# Patient Record
Sex: FEMALE | Race: WHITE | NOT HISPANIC OR LATINO | Employment: FULL TIME | URBAN - METROPOLITAN AREA
[De-identification: names, ages, dates, MRNs, and addresses within clinical notes are randomized per-mention and may not be internally consistent; named-entity substitution may affect disease eponyms.]

---

## 2017-08-15 ENCOUNTER — OFFICE VISIT (OUTPATIENT)
Dept: FAMILY MEDICINE | Facility: CLINIC | Age: 27
End: 2017-08-15
Payer: COMMERCIAL

## 2017-08-15 VITALS
HEIGHT: 68 IN | DIASTOLIC BLOOD PRESSURE: 66 MMHG | BODY MASS INDEX: 44.41 KG/M2 | HEART RATE: 78 BPM | TEMPERATURE: 98.4 F | OXYGEN SATURATION: 97 % | SYSTOLIC BLOOD PRESSURE: 118 MMHG | WEIGHT: 293 LBS

## 2017-08-15 DIAGNOSIS — Z13.6 CARDIOVASCULAR SCREENING; LDL GOAL LESS THAN 160: ICD-10-CM

## 2017-08-15 DIAGNOSIS — N91.5 OLIGOMENORRHEA: ICD-10-CM

## 2017-08-15 DIAGNOSIS — F43.9 STRESS: ICD-10-CM

## 2017-08-15 DIAGNOSIS — E66.01 MORBID OBESITY, UNSPECIFIED OBESITY TYPE (H): ICD-10-CM

## 2017-08-15 DIAGNOSIS — Z00.01 ENCOUNTER FOR ROUTINE ADULT HEALTH EXAMINATION WITH ABNORMAL FINDINGS: Primary | ICD-10-CM

## 2017-08-15 PROCEDURE — 99385 PREV VISIT NEW AGE 18-39: CPT | Performed by: PHYSICIAN ASSISTANT

## 2017-08-15 PROCEDURE — 99213 OFFICE O/P EST LOW 20 MIN: CPT | Mod: 25 | Performed by: PHYSICIAN ASSISTANT

## 2017-08-15 ASSESSMENT — PATIENT HEALTH QUESTIONNAIRE - PHQ9
SUM OF ALL RESPONSES TO PHQ QUESTIONS 1-9: 11
SUM OF ALL RESPONSES TO PHQ QUESTIONS 1-9: 11
10. IF YOU CHECKED OFF ANY PROBLEMS, HOW DIFFICULT HAVE THESE PROBLEMS MADE IT FOR YOU TO DO YOUR WORK, TAKE CARE OF THINGS AT HOME, OR GET ALONG WITH OTHER PEOPLE: SOMEWHAT DIFFICULT

## 2017-08-15 NOTE — MR AVS SNAPSHOT
After Visit Summary   8/15/2017    Paz Uribe    MRN: [id number]           Patient Information     Date Of Birth          1990        Visit Information        Provider Department      8/15/2017 5:20 PM Adolfo Andrew PA-C [facility]        Today's Diagnoses     Encounter for routine adult health examination with abnormal findings    -  1    CARDIOVASCULAR SCREENING; LDL GOAL LESS THAN 160        Morbid obesity, unspecified obesity type (H)        Oligomenorrhea        Stress          Care Instructions      Preventive Health Recommendations  Female Ages 26 - 39  Yearly exam:   See your health care provider every year in order to    Review health changes.     Discuss preventive care.      Review your medicines if you your doctor has prescribed any.    Until age 30: Get a Pap test every three years (more often if you have had an abnormal result).    After age 30: Talk to your doctor about whether you should have a Pap test every 3 years or have a Pap test with HPV screening every 5 years.   You do not need a Pap test if your uterus was removed (hysterectomy) and you have not had cancer.  You should be tested each year for STDs (sexually transmitted diseases), if you're at risk.   Talk to your provider about how often to have your cholesterol checked.  If you are at risk for diabetes, you should have a diabetes test (fasting glucose).  Shots: Get a flu shot each year. Get a tetanus shot every 10 years.   Nutrition:     Eat at least 5 servings of fruits and vegetables each day.    Eat whole-grain bread, whole-wheat pasta and brown rice instead of white grains and rice.    Talk to your provider about Calcium and Vitamin D.     Lifestyle    Exercise at least 150 minutes a week (30 minutes a day, 5 days of the week). This will help you control your weight and prevent disease.    Limit alcohol to one drink per day.    No smoking.     Wear sunscreen to prevent skin  cancer.    See your dentist every six months for an exam and cleaning.    Preventive Health Recommendations  Female Ages 26 - 39  Yearly exam:   See your health care provider every year in order to    Review health changes.     Discuss preventive care.      Review your medicines if you your doctor has prescribed any.    Until age 30: Get a Pap test every three years (more often if you have had an abnormal result).    After age 30: Talk to your doctor about whether you should have a Pap test every 3 years or have a Pap test with HPV screening every 5 years.   You do not need a Pap test if your uterus was removed (hysterectomy) and you have not had cancer.  You should be tested each year for STDs (sexually transmitted diseases), if you're at risk.   Talk to your provider about how often to have your cholesterol checked.  If you are at risk for diabetes, you should have a diabetes test (fasting glucose).  Shots: Get a flu shot each year. Get a tetanus shot every 10 years.   Nutrition:     Eat at least 5 servings of fruits and vegetables each day.    Eat whole-grain bread, whole-wheat pasta and brown rice instead of white grains and rice.    Talk to your provider about Calcium and Vitamin D.     Lifestyle    Exercise at least 150 minutes a week (30 minutes a day, 5 days of the week). This will help you control your weight and prevent disease.    Limit alcohol to one drink per day.    No smoking.     Wear sunscreen to prevent skin cancer.    See your dentist every six months for an exam and cleaning.            Follow-ups after your visit        Who to contact     If you have questions or need follow up information about today's clinic visit or your schedule please contact [facility] directly at [phone].  Normal or non-critical lab and imaging results will be communicated to you by MyChart, letter or phone within 4 business days after the clinic has received the results. If you do not hear from us  "within 7 days, please contact the clinic through [employer]Ke or phone. If you have a critical or abnormal lab result, we will notify you by phone as soon as possible.  Submit refill requests through [employer] or call your pharmacy and they will forward the refill request to us. Please allow 3 business days for your refill to be completed.          Additional Information About Your Visit        Cellvine[employer] Information     QingKe lets you send messages to your doctor, view your test results, renew your prescriptions, schedule appointments and more. To sign up, go to [email or web address] . Click on \"Log in\" on the left side of the screen, which will take you to the Welcome page. Then click on \"Sign up Now\" on the right side of the page.     You will be asked to enter the access code listed below, as well as some personal information. Please follow the directions to create your username and password.     Your access code is: [email or web address]  Expires: 2017  6:09 PM     Your access code will  in 90 days. If you need help or a new code, please call your [place] clinic or [phone].        Care EveryWhere ID     This is your Care EveryWhere ID. This could be used by other organizations to access your [place] medical records  [id number]        Your Vitals Were     Pulse Temperature Height Last Period Pulse Oximetry BMI (Body Mass Index)    78 98.4  F (36.9  C) (Oral) 5' 7.5\" (1.715 m) 2017 (Approximate) 97% 50.68 kg/m2       Blood Pressure from Last 3 Encounters:   08/15/17 118/66   01/14/15 98/72    Weight from Last 3 Encounters:   08/15/17 (!) 328 lb 6.4 oz (149 kg)   01/14/15 257 lb (116.6 kg)              Today, you had the following     No orders found for display       Primary Care Provider Office Phone # Fax #    Adolfo Andrew PA-C [phone]       [id number]  [facility] [address]        Equal Access to Services     ANNITA HEARN AH: irma More " drea gaspar waxmichelle davidin hayaaten hodgesjeferson martinezten abeba. So [facility] [phone].    ATENCIÓN: Si siriala teofilo, tiene a cherry disposición servicios gratuitos de asistencia lingüística. Llame al [phone].    We comply with applicable federal civil rights laws and [place] laws. We do not discriminate on the basis of race, color, national origin, age, disability sex, sexual orientation or gender identity.            Thank you!     Thank you for choosing [facility]  for your care. Our goal is always to provide you with excellent care. Hearing back from our patients is one way we can continue to improve our services. Please take a few minutes to complete the written survey that you may receive in the mail after your visit with us. Thank you!             Your Updated Medication List - Protect others around you: Learn how to safely use, store and throw away your medicines at www.disposemymeds.org.      Notice  As of 8/15/2017  6:09 PM    You have not been prescribed any medications.

## 2017-08-15 NOTE — PROGRESS NOTES
SUBJECTIVE:   CC: Paz Uribe is an 27 year old woman who presents for preventive health visit.     Physical   Annual:     Getting at least 3 servings of Calcium per day::  Yes    Bi-annual eye exam::  NO    Dental care twice a year::  NO    Sleep apnea or symptoms of sleep apnea::  None    Diet::  Regular (no restrictions)    Frequency of exercise::  2-3 days/week    Duration of exercise::  30-45 minutes    Taking medications regularly::  Yes    Medication side effects::  Not applicable    Additional concerns today::  No      Patient presents for annual physical  She is utd on pap smear - last year [facility] 6/[facility]/16; results normal  Periods historically abnormal; Once every 2-3 months; not overly painful; mostly spotting, last 4-5 days  After starting ocps will get heavy bleeding, bad cramping  Would like to discuss PCOS?    She is also noting increasing stress   dealing with thyroid cancer; dealing with his bills  Family members with some CA as well  New position at work  Lots of stressors building up over time - less ability to manage        Today's PHQ-2 Score: PHQ-2 ( 1999 Pf[employer]) 8/15/2017   Q1: Little interest or pleasure in doing things 1   Q2: Feeling down, depressed or hopeless 2   PHQ-2 Score 3   Q1: Little interest or pleasure in doing things Several days   Q2: Feeling down, depressed or hopeless More than half the days   PHQ-2 Score 3       Abuse: Current or Past(Physical, Sexual or Emotional)- No  Do you feel safe in your environment - Yes    Social History   Substance Use Topics     Smoking status: Never Smoker     Smokeless tobacco: Never Used     Alcohol use No     The patient does not drink >3 drinks per day nor >7 drinks per week.    Reviewed orders with patient.  Reviewed health maintenance and updated orders accordingly - Yes  Labs reviewed in EPIC    Mammogram not appropriate for this patient based on age.    Pertinent mammograms are reviewed under the imaging tab.  History of  "abnormal Pap smear: NO - age 21-29 PAP every 3 years recommended    Reviewed and updated as needed this visit by clinical staff  Tobacco  Allergies  Meds  Med Hx  Surg Hx  Fam Hx  Soc Hx        Reviewed and updated as needed this visit by Provider        History reviewed. No pertinent past medical history.   History reviewed. No pertinent surgical history.    ROS:  C: NEGATIVE for fever, chills, change in weight  I: NEGATIVE for worrisome rashes, moles or lesions  E: NEGATIVE for vision changes or irritation  ENT: NEGATIVE for ear, mouth and throat problems  R: NEGATIVE for significant cough or SOB  B: NEGATIVE for masses, tenderness or discharge  CV: NEGATIVE for chest pain, palpitations or peripheral edema  GI: NEGATIVE for nausea, abdominal pain, heartburn, or change in bowel habits   female: menses: irregular q2-3 months  M: NEGATIVE for significant arthralgias or myalgia  N: NEGATIVE for weakness, dizziness or paresthesias  PSYCHIATRIC: POSITIVE for increased stress     OBJECTIVE:   /66  Pulse 78  Temp 98.4  F (36.9  C) (Oral)  Ht 5' 7.5\" (1.715 m)  Wt (!) 328 lb 6.4 oz (149 kg)  LMP 06/27/2017 (Approximate)  SpO2 97%  BMI 50.68 kg/m2  EXAM:  GENERAL: healthy, alert and no distress  EYES: Eyes grossly normal to inspection, PERRL and conjunctivae and sclerae normal  HENT: ear canals and TM's normal, nose and mouth without ulcers or lesions  NECK: no adenopathy, no asymmetry, masses, or scars and thyroid normal to palpation  RESP: lungs clear to auscultation - no rales, rhonchi or wheezes  CV: regular rates and rhythm, normal S1 S2, no S3 or S4 and no murmur, click or rub  ABDOMEN: soft, nontender, no hepatosplenomegaly, no masses and bowel sounds normal   (female): deferred  MS: no gross musculoskeletal defects noted, no edema  SKIN: scattered light hair growth along neck, chin  PSYCH: mentation appears normal, affect normal/bright    ASSESSMENT/PLAN:   1. Encounter for routine adult " "health examination with abnormal findings  26yo female for annual exam and to discuss below. She is utd on pap; normal results and will need update at age 30 with co-testing    2. CARDIOVASCULAR SCREENING; LDL GOAL LESS THAN 160  - Lipid panel reflex to direct LDL; Future    3. Morbid obesity, unspecified obesity type (H)  Likely adding to below. She and her  have just joined a gym and are working at being more active. Improving meals. Recommended calorie and exercise goals  - Comprehensive metabolic panel; Future  - Lipid panel reflex to direct LDL; Future    4. Oligomenorrhea  PCOS? Likely dx given even mild hirsutism, oligomenorrhea. Will screen labs and she will consider seeing ob/gyn as well. I have recommended u/s as well and she'll consider this. Holding testosterone testing today. Recommend increasing activity and improving diet to see if this will kickstart menses. She has not tolerated ocp in the past and for regulation we also reviewed other alternatives, mostly iud and implantable.   - Lutropin; Future  - Follicle stimulating hormone; Future  - TSH with free T4 reflex; Future  - Prolactin; Future    5. Stress  For now she has declined treatment with therapy or medication but will discuss further with her spouse to consider. Can call to get referral for therapy if desiring. Continue to monitor      COUNSELING:  Reviewed preventive health counseling, as reflected in patient instructions       Regular exercise       Healthy diet/nutrition       reports that she has never smoked. She has never used smokeless tobacco.    Estimated body mass index is 50.68 kg/(m^2) as calculated from the following:    Height as of this encounter: 5' 7.5\" (1.715 m).    Weight as of this encounter: 328 lb 6.4 oz (149 kg).   Weight management plan: Discussed healthy diet and exercise guidelines and patient will follow up in 12 months in clinic to re-evaluate.    Counseling Resources:  ATP IV Guidelines  Pooled Cohorts " Equation Calculator  Breast Cancer Risk Calculator  FRAX Risk Assessment  ICSI Preventive Guidelines  Dietary Guidelines for Americans, 2010  USDA's MyPlate  ASA Prophylaxis  Lung CA Screening    Adolfo Andrew PA-C  [facility]  Answers for HPI/ROS submitted by the patient on 8/15/2017   PHQ-2 Score: 3  If you checked off any problems, how difficult have these problems made it for you to do your work, take care of things at home, or get along with other people?: Somewhat difficult  PHQ9 TOTAL SCORE: 11

## 2017-08-15 NOTE — NURSING NOTE
"Chief Complaint   Patient presents with     Physical       Initial /66  Pulse 78  Temp 98.4  F (36.9  C) (Oral)  Ht 5' 7.5\" (1.715 m)  Wt (!) 328 lb 6.4 oz (149 kg)  LMP 06/27/2017 (Approximate)  SpO2 97%  BMI 50.68 kg/m2 Estimated body mass index is 50.68 kg/(m^2) as calculated from the following:    Height as of this encounter: 5' 7.5\" (1.715 m).    Weight as of this encounter: 328 lb 6.4 oz (149 kg).  Medication Reconciliation: complete    "

## 2017-08-16 ASSESSMENT — PATIENT HEALTH QUESTIONNAIRE - PHQ9: SUM OF ALL RESPONSES TO PHQ QUESTIONS 1-9: 11

## 2017-08-19 ENCOUNTER — HEALTH MAINTENANCE LETTER (OUTPATIENT)
Age: 27
End: 2017-08-19

## 2017-08-24 DIAGNOSIS — Z13.6 CARDIOVASCULAR SCREENING; LDL GOAL LESS THAN 160: ICD-10-CM

## 2017-08-24 DIAGNOSIS — N91.5 OLIGOMENORRHEA: ICD-10-CM

## 2017-08-24 DIAGNOSIS — E66.01 MORBID OBESITY, UNSPECIFIED OBESITY TYPE (H): ICD-10-CM

## 2017-08-24 LAB
ALBUMIN SERPL-MCNC: 3.3 G/DL (ref 3.4–5)
ALP SERPL-CCNC: 103 U/L (ref 40–150)
ALT SERPL W P-5'-P-CCNC: 22 U/L (ref 0–50)
ANION GAP SERPL CALCULATED.3IONS-SCNC: 11 MMOL/L (ref 3–14)
AST SERPL W P-5'-P-CCNC: 13 U/L (ref 0–45)
BILIRUB SERPL-MCNC: 0.4 MG/DL (ref 0.2–1.3)
BUN SERPL-MCNC: 13 MG/DL (ref 7–30)
CALCIUM SERPL-MCNC: 8.4 MG/DL (ref 8.5–10.1)
CHLORIDE SERPL-SCNC: 108 MMOL/L (ref 94–109)
CHOLEST SERPL-MCNC: 127 MG/DL
CO2 SERPL-SCNC: 24 MMOL/L (ref 20–32)
CREAT SERPL-MCNC: 0.69 MG/DL (ref 0.52–1.04)
FSH SERPL-ACNC: 4.9 IU/L
GFR SERPL CREATININE-BSD FRML MDRD: >90 ML/MIN/1.7M2
GLUCOSE SERPL-MCNC: 84 MG/DL (ref 70–99)
HDLC SERPL-MCNC: 47 MG/DL
LDLC SERPL CALC-MCNC: 61 MG/DL
LH SERPL-ACNC: 4.6 IU/L
NONHDLC SERPL-MCNC: 80 MG/DL
POTASSIUM SERPL-SCNC: 4.1 MMOL/L (ref 3.4–5.3)
PROLACTIN SERPL-MCNC: 13 UG/L (ref 3–27)
PROT SERPL-MCNC: 7.2 G/DL (ref 6.8–8.8)
SODIUM SERPL-SCNC: 143 MMOL/L (ref 133–144)
TRIGL SERPL-MCNC: 95 MG/DL
TSH SERPL DL<=0.005 MIU/L-ACNC: 2.78 MU/L (ref 0.4–4)

## 2017-08-24 PROCEDURE — 80053 COMPREHEN METABOLIC PANEL: CPT | Performed by: PHYSICIAN ASSISTANT

## 2017-08-24 PROCEDURE — 36415 COLL VENOUS BLD VENIPUNCTURE: CPT | Performed by: PHYSICIAN ASSISTANT

## 2017-08-24 PROCEDURE — 84443 ASSAY THYROID STIM HORMONE: CPT | Performed by: PHYSICIAN ASSISTANT

## 2017-08-24 PROCEDURE — 83001 ASSAY OF GONADOTROPIN (FSH): CPT | Performed by: PHYSICIAN ASSISTANT

## 2017-08-24 PROCEDURE — 84146 ASSAY OF PROLACTIN: CPT | Performed by: PHYSICIAN ASSISTANT

## 2017-08-24 PROCEDURE — 83002 ASSAY OF GONADOTROPIN (LH): CPT | Performed by: PHYSICIAN ASSISTANT

## 2017-08-24 PROCEDURE — 80061 LIPID PANEL: CPT | Performed by: PHYSICIAN ASSISTANT

## 2017-09-06 ENCOUNTER — NURSE TRIAGE (OUTPATIENT)
Dept: FAMILY MEDICINE | Facility: CLINIC | Age: 27
End: 2017-09-06

## 2017-09-06 NOTE — TELEPHONE ENCOUNTER
", Terry jeff, wife on the way out the door to get to work/ past 1-2 weeks has had intermittant \"slurred speech\" dizziness and nausea/ did talk to wife/no symptoms now but the past 2 weeks has had these symptoms/ says when the occur she can talk but it is hard to get the words out and it is also had to walk/ \"her thighs are like jello\" will send for an appointment per guideline but she was advised if she gets worse she should be seen right away / she is fine now/ has none of the afore mentioned symptoms Bob Francis RN [phone]  Reason for Disposition    [1] MODERATE dizziness (e.g., interferes with normal activities) AND [2] has NOT been evaluated by physician for this  (Exception: dizziness caused by heat exposure, sudden standing, or poor fluid intake)    Additional Information    Negative: [1] SEVERE weakness (i.e., unable to walk or barely able to walk, requires support) AND [2] new onset or worsening    Negative: [1] Weakness (i.e., paralysis, loss of muscle strength) of the face, arm / hand, or leg / foot on one side of the body AND [2] sudden onset AND [3] present now    Negative: [1] Numbness (i.e., loss of sensation) of the face, arm / hand, or leg / foot on one side of the body AND [2] sudden onset AND [3] present now    Negative: [1] Loss of speech or garbled speech AND [2] sudden onset AND [3] present now    Negative: Difficult to awaken or acting confused  (e.g., disoriented, slurred speech)    Negative: Sounds like a life-threatening emergency to the triager    Negative: Confusion, disorientation, or hallucinations is main symptom    Negative: Neck pain is main symptom (and having weakness, numbness, or tingling in arm / hand because of neck pain)    Negative: Back pain is main symptom (and having weakness, numbness, or tingling in leg because of back pain)    Negative: Hand pain is main symptom (and having mild weakness, numbness, or tingling in hand related to hand pain)    " "Dizziness is main symptom    Negative: Severe difficulty breathing (e.g., struggling for each breath, speaks in single words)    Negative: [1] Difficulty breathing or swallowing AND [2] started suddenly after medicine, an allergic food or bee sting    Negative: Shock suspected (e.g., cold/pale/clammy skin, too weak to stand, low BP, rapid pulse)    Negative: Difficult to awaken or acting confused  (e.g., disoriented, slurred speech)    Negative: [1] Weakness (i.e., paralysis, loss of muscle strength) of the face, arm or leg on one side of the body AND [2] sudden onset AND [3] present now    Negative: [1] Numbness (i.e., loss of sensation) of the face, arm or leg on one side of the body AND [2] sudden onset AND [3] present now    Negative: [1] Loss of speech or garbled speech AND [2] sudden onset AND [3] present now    Negative: Overdose (accidental or intentional) of medications    Negative: [1] Fainted > 15 minutes ago AND [2] still feels too weak or dizzy to stand    Negative: Heart beating < 50 beats per minute OR > 140 beats per minute    Negative: Sounds like a life-threatening emergency to the triager    Negative: Chest pain    Negative: Rectal bleeding, bloody stool, or tarry-black stool    Negative: [1] Vomiting AND [2] contains red blood or black (\"coffee ground\") material    Negative: Vomiting is main symptom    Negative: Diarrhea is main symptom    Negative: Headache is main symptom    Negative: Patient states that he/she is having an anxiety/panic attack    Negative: Dizziness from low blood sugar (i.e., < 60 mg/dl or 3.5 mmol/l)    Negative: Dizziness is described as a spinning sensation (i.e., vertigo)    Negative: Heat exhaustion suspected    Negative: Difficulty breathing    Negative: SEVERE dizziness (e.g., unable to stand, requires support to walk, feels like passing out now)    Negative: Extra heart beats OR irregular heart beating  (i.e., \"palpitations\")    Negative: [1] Drinking very little AND " [2] dehydration suspected (e.g., no urine > 12 hours, very dry mouth, very lightheaded)    Negative: Patient sounds very sick or weak to the triager    Negative: [1] Dizziness caused by heat exposure, sudden standing, or poor fluid intake AND [2] no improvement after 2 hours of rest and fluids    Negative: [1] Fever > 103 F (39.4 C) AND [2] not able to get the fever down using Fever Care Advice    Negative: [1] Fever > 101 F (38.3 C) AND [2] age > 60    Negative: [1] Fever > 101 F (38.3 C) AND [2] bedridden (e.g., nursing home patient, CVA, chronic illness, recovering from surgery)    Negative: [1] Fever > 100.5 F (38.1 C) AND [2] diabetes mellitus or weak immune system (e.g., HIV positive, cancer chemo, splenectomy, organ transplant, chronic steroids)    Protocols used: DIZZINESS - LIGHTHEADEDNESS-ADULT-AH, NEUROLOGIC DEFICIT-ADULT-AH

## 2017-09-07 ENCOUNTER — OFFICE VISIT (OUTPATIENT)
Dept: FAMILY MEDICINE | Facility: CLINIC | Age: 27
End: 2017-09-07
Payer: COMMERCIAL

## 2017-09-07 VITALS
OXYGEN SATURATION: 97 % | WEIGHT: 293 LBS | SYSTOLIC BLOOD PRESSURE: 118 MMHG | DIASTOLIC BLOOD PRESSURE: 70 MMHG | TEMPERATURE: 98.5 F | BODY MASS INDEX: 50.94 KG/M2 | HEART RATE: 64 BPM

## 2017-09-07 DIAGNOSIS — R42 DIZZINESS: Primary | ICD-10-CM

## 2017-09-07 PROBLEM — Z13.6 CARDIOVASCULAR SCREENING; LDL GOAL LESS THAN 160: Status: ACTIVE | Noted: 2017-09-07

## 2017-09-07 LAB
ANION GAP SERPL CALCULATED.3IONS-SCNC: 11 MMOL/L (ref 3–14)
BASOPHILS # BLD AUTO: 0 10E9/L (ref 0–0.2)
BASOPHILS NFR BLD AUTO: 0.2 %
BUN SERPL-MCNC: 11 MG/DL (ref 7–30)
CALCIUM SERPL-MCNC: 8.9 MG/DL (ref 8.5–10.1)
CHLORIDE SERPL-SCNC: 109 MMOL/L (ref 94–109)
CO2 SERPL-SCNC: 22 MMOL/L (ref 20–32)
CREAT SERPL-MCNC: 0.74 MG/DL (ref 0.52–1.04)
DIFFERENTIAL METHOD BLD: ABNORMAL
EOSINOPHIL # BLD AUTO: 0.2 10E9/L (ref 0–0.7)
EOSINOPHIL NFR BLD AUTO: 2.2 %
ERYTHROCYTE [DISTWIDTH] IN BLOOD BY AUTOMATED COUNT: 13.9 % (ref 10–15)
ERYTHROCYTE [SEDIMENTATION RATE] IN BLOOD BY WESTERGREN METHOD: 18 MM/H (ref 0–20)
GFR SERPL CREATININE-BSD FRML MDRD: >90 ML/MIN/1.7M2
GLUCOSE SERPL-MCNC: 85 MG/DL (ref 70–99)
HCT VFR BLD AUTO: 40.1 % (ref 35–47)
HGB BLD-MCNC: 13.3 G/DL (ref 11.7–15.7)
LYMPHOCYTES # BLD AUTO: 3.3 10E9/L (ref 0.8–5.3)
LYMPHOCYTES NFR BLD AUTO: 35.5 %
MCH RBC QN AUTO: 26.1 PG (ref 26.5–33)
MCHC RBC AUTO-ENTMCNC: 33.2 G/DL (ref 31.5–36.5)
MCV RBC AUTO: 79 FL (ref 78–100)
MONOCYTES # BLD AUTO: 0.6 10E9/L (ref 0–1.3)
MONOCYTES NFR BLD AUTO: 6 %
NEUTROPHILS # BLD AUTO: 5.2 10E9/L (ref 1.6–8.3)
NEUTROPHILS NFR BLD AUTO: 56.1 %
PLATELET # BLD AUTO: 306 10E9/L (ref 150–450)
POTASSIUM SERPL-SCNC: 4.6 MMOL/L (ref 3.4–5.3)
RBC # BLD AUTO: 5.09 10E12/L (ref 3.8–5.2)
SODIUM SERPL-SCNC: 142 MMOL/L (ref 133–144)
WBC # BLD AUTO: 9.2 10E9/L (ref 4–11)

## 2017-09-07 PROCEDURE — 85025 COMPLETE CBC W/AUTO DIFF WBC: CPT | Performed by: NURSE PRACTITIONER

## 2017-09-07 PROCEDURE — 36415 COLL VENOUS BLD VENIPUNCTURE: CPT | Performed by: NURSE PRACTITIONER

## 2017-09-07 PROCEDURE — 80048 BASIC METABOLIC PNL TOTAL CA: CPT | Performed by: NURSE PRACTITIONER

## 2017-09-07 PROCEDURE — 99214 OFFICE O/P EST MOD 30 MIN: CPT | Performed by: NURSE PRACTITIONER

## 2017-09-07 PROCEDURE — 85652 RBC SED RATE AUTOMATED: CPT | Performed by: NURSE PRACTITIONER

## 2017-09-07 NOTE — MR AVS SNAPSHOT
After Visit Summary   9/7/2017    Paz Uribe    MRN: [id number]           Patient Information     Date Of Birth          1990        Visit Information        Provider Department      9/7/2017 8:20 AM Bess Ann Ra, APRN CNP [facility]        Today's Diagnoses     Dizziness    -  1       Follow-ups after your visit        Additional Services     NEUROLOGY ADULT REFERRAL       Your provider has referred you for the following:   Consult at [facility]: [facility] - [place] [phone]   [email or web address]  [facility]: [facility] - [place] [phone]   [email or web address]    Please be aware that coverage of these services is subject to the terms and limitations of your health insurance plan.  Call member services at your health plan with any benefit or coverage questions.      Please bring the following with you to your appointment:    (1) Any X-Rays, CTs or MRIs which have been performed.  Contact the facility where they were done to arrange for  prior to your scheduled appointment.    (2) List of current medications  (3) This referral request   (4) Any documents/labs given to you for this referral                  Who to contact     If you have questions or need follow up information about today's clinic visit or your schedule please contact [facility] directly at [phone].  Normal or non-critical lab and imaging results will be communicated to you by MyChart, letter or phone within 4 business days after the clinic has received the results. If you do not hear from us within 7 days, please contact the clinic through MyChart or phone. If you have a critical or abnormal lab result, we will notify you by phone as soon as possible.  Submit refill requests through "[employer] or call your pharmacy and they will forward the refill request to us. Please allow 3 business days for your  "refill to be completed.          Additional Information About Your Visit        [employer]app[employer]nai [employer] Information     VTM lets you send messages to your doctor, view your test results, renew your prescriptions, schedule appointments and more. To sign up, go to [email or web address] . Click on \"Log in\" on the left side of the screen, which will take you to the Welcome page. Then click on \"Sign up Now\" on the right side of the page.     You will be asked to enter the access code listed below, as well as some personal information. Please follow the directions to create your username and password.     Your access code is: [email or web address]  Expires: 2017  6:09 PM     Your access code will  in 90 days. If you need help or a new code, please call your [place] clinic or [phone].        Care EveryWhere ID     This is your Care EveryWhere ID. This could be used by other organizations to access your [place] medical records  [id number]        Your Vitals Were     Pulse Temperature Last Period Pulse Oximetry BMI (Body Mass Index)       64 98.5  F (36.9  C) (Oral) 2017 (Approximate) 97% 50.94 kg/m2        Blood Pressure from Last 3 Encounters:   17 118/70   08/15/17 118/66   01/14/15 98/72    Weight from Last 3 Encounters:   17 (!) 330 lb 1.6 oz (149.7 kg)   08/15/17 (!) 328 lb 6.4 oz (149 kg)   01/14/15 257 lb (116.6 kg)              We Performed the Following     Basic metabolic panel     CBC with platelets and differential     ESR: Erythrocyte sedimentation rate     NEUROLOGY ADULT REFERRAL        Primary Care Provider Office Phone # Fax #    Adolfo Andrew PA-C [phone]       [id number]  [facility] [address]        Equal Access to Services     ANNITA HEARN : Karthikeyan de luna Soanuradha, waaxda luqadaha, qaybta kaalmada melaniyakenyatta, wallace us. [facility] [phone].    ATENCIÓN: Si habla español, tiene a cherry disposición servicios gratuitos " de asistencia lingüística. Isrrael nicolas [phone].    We comply with applicable federal civil rights laws and [place] laws. We do not discriminate on the basis of race, color, national origin, age, disability sex, sexual orientation or gender identity.            Thank you!     Thank you for choosing [facility]  for your care. Our goal is always to provide you with excellent care. Hearing back from our patients is one way we can continue to improve our services. Please take a few minutes to complete the written survey that you may receive in the mail after your visit with us. Thank you!             Your Updated Medication List - Protect others around you: Learn how to safely use, store and throw away your medicines at www.disposemymeds.org.      Notice  As of 9/7/2017  8:45 AM    You have not been prescribed any medications.

## 2017-09-07 NOTE — PROGRESS NOTES
SUBJECTIVE:   Paz Uribe is a 27 year old female who presents to clinic today for the following health issues:      Dizziness      Duration: 2.5 weeks    Description   Feeling faint:  no   Feeling like the surroundings are moving: no   Loss of consciousness or falls: no     Intensity:  moderate    Accompanying signs and symptoms:   Nausea/vomitting: no   Palpitations: no   Weakness in arms or legs: YES- Legs a little bit, hard to walk at times  Vision or speech changes: YES- Speech  Ringing in ears (Tinnitus): no   Hearing loss related to dizziness: no   Other (fevers/chills/sweating/dyspnea): no     History (similar episodes/head trauma/previous evaluation/recent bleeding): None    Precipitating or alleviating factors (new meds/chemicals): None  Worse with activity/head movement: YES    Therapies tried and outcome: None    Symptoms started around 8/20/17.  Pt notes dizziness, fogginess.  Not true vertigo.  During this time pt finds concentration difficult, speech can be slurred.  Word finding difficulty.  Upper R lip seems to droop very slightly during this time as well.  Sometimes will have a headache after symptoms resolve.  Symptoms have occurred daily.  Last anywhere from 5 minutes to hours.  Average is 15-30 minutes.    No known trigger.   Laying down and resting seems to help.    No fevers.  No URI symptoms.  No cough.  No sob.  No chest pain, palpitations.  Does not feel faint.  Taking in food and fluids as usual.  Normal output.  No recent trauma to the head or neck.  Her periods are irregular, thought to be due to PCOS.  Did a progesterone withdrawal test through obgyn and feels menses is about to start.  Sleeping as usual.  There has been an increase in external stressors in her life.    No smoking.  No regular etoh use.  No drug use.    Problem list and histories reviewed & adjusted, as indicated.  Additional history: as documented    Patient Active Problem List   Diagnosis     Morbid obesity  (H)     History reviewed. No pertinent surgical history.    Social History   Substance Use Topics     Smoking status: Never Smoker     Smokeless tobacco: Never Used     Alcohol use No     History reviewed. No pertinent family history.          Reviewed and updated as needed this visit by clinical staffTobacco  Allergies  Meds  Med Hx  Surg Hx  Fam Hx  Soc Hx      Reviewed and updated as needed this visit by Provider         ROS:  SEE HPI.    OBJECTIVE:     /70  Pulse 64  Temp 98.5  F (36.9  C) (Oral)  Wt (!) 330 lb 1.6 oz (149.7 kg)  LMP 06/27/2017 (Approximate)  SpO2 97%  BMI 50.94 kg/m2  Body mass index is 50.94 kg/(m^2).  GENERAL: healthy, alert and no distress  EYES: Eyes grossly normal to inspection, PERRL and conjunctivae and sclerae normal  HENT: ear canals and TM's normal, nose and mouth without ulcers or lesions  NECK: no adenopathy, no asymmetry, masses, or scars and thyroid normal to palpation  RESP: lungs clear to auscultation - no rales, rhonchi or wheezes  CV: regular rate and rhythm, normal S1 S2, no S3 or S4, no murmur, click or rub, no peripheral edema and peripheral pulses strong  ABDOMEN: soft, nontender, bowel sounds normal and obese  NEURO: Normal strength and tone, sensory exam grossly normal, mentation intact, cranial nerves 2-12 intact and rapid alternating movements normal  PSYCH: mentation appears normal, affect normal/bright    Diagnostic Test Results:  none     ASSESSMENT/PLAN:   1. Dizziness  27 y.o. Female, here today with recent onset intermittent dizziness, slurred speech.  No known triggers.  Stable, not worsening.  Recent labs for WWE completed, normal TSH.  Additional labs today, neurology referral.  If any change or worsening she will go to the ED.  Pt agrees with plan and verbalized understanding.  - NEUROLOGY ADULT REFERRAL  - CBC with platelets and differential  - Basic metabolic panel  - ESR: Erythrocyte sedimentation rate    AGATHA Rosen Ra  [facility] [place]

## 2017-09-07 NOTE — NURSING NOTE
"Chief Complaint   Patient presents with     Dizziness       Initial /70  Pulse 64  Temp 98.5  F (36.9  C) (Oral)  Wt (!) 330 lb 1.6 oz (149.7 kg)  LMP 06/27/2017 (Approximate)  SpO2 97%  BMI 50.94 kg/m2 Estimated body mass index is 50.94 kg/(m^2) as calculated from the following:    Height as of 8/15/17: 5' 7.5\" (1.715 m).    Weight as of this encounter: 330 lb 1.6 oz (149.7 kg).  Medication Reconciliation: complete   Barb DIAMOND M.A.      "

## 2017-09-21 ENCOUNTER — TRANSFERRED RECORDS (OUTPATIENT)
Dept: HEALTH INFORMATION MANAGEMENT | Facility: CLINIC | Age: 27
End: 2017-09-21

## 2017-10-04 ENCOUNTER — TRANSFERRED RECORDS (OUTPATIENT)
Dept: HEALTH INFORMATION MANAGEMENT | Facility: CLINIC | Age: 27
End: 2017-10-04

## 2018-01-25 ENCOUNTER — TRANSFERRED RECORDS (OUTPATIENT)
Dept: HEALTH INFORMATION MANAGEMENT | Facility: CLINIC | Age: 28
End: 2018-01-25

## 2018-07-12 ENCOUNTER — TRANSFERRED RECORDS (OUTPATIENT)
Dept: HEALTH INFORMATION MANAGEMENT | Facility: CLINIC | Age: 28
End: 2018-07-12

## 2018-12-30 ENCOUNTER — HOSPITAL ENCOUNTER (EMERGENCY)
Facility: CLINIC | Age: 28
Discharge: HOME | End: 2018-12-30
Attending: EMERGENCY MEDICINE | Admitting: EMERGENCY MEDICINE
Payer: COMMERCIAL

## 2018-12-30 VITALS
DIASTOLIC BLOOD PRESSURE: 83 MMHG | TEMPERATURE: 97.7 F | RESPIRATION RATE: 14 BRPM | HEART RATE: 67 BPM | SYSTOLIC BLOOD PRESSURE: 126 MMHG | OXYGEN SATURATION: 99 %

## 2018-12-30 DIAGNOSIS — E86.0 DEHYDRATION: ICD-10-CM

## 2018-12-30 DIAGNOSIS — R11.2 NAUSEA AND VOMITING, INTRACTABILITY OF VOMITING NOT SPECIFIED, UNSPECIFIED VOMITING TYPE: ICD-10-CM

## 2018-12-30 LAB
ALBUMIN SERPL-MCNC: 3.5 G/DL (ref 3.4–5)
ALBUMIN UR-MCNC: 100 MG/DL
ALP SERPL-CCNC: 91 U/L (ref 40–150)
ALT SERPL W P-5'-P-CCNC: 42 U/L (ref 0–50)
ANION GAP SERPL CALCULATED.3IONS-SCNC: 13 MMOL/L (ref 3–14)
APPEARANCE UR: ABNORMAL
AST SERPL W P-5'-P-CCNC: 35 U/L (ref 0–45)
BACTERIA #/AREA URNS HPF: ABNORMAL /HPF
BASOPHILS # BLD AUTO: 0.1 10E9/L (ref 0–0.2)
BASOPHILS NFR BLD AUTO: 0.8 %
BILIRUB SERPL-MCNC: 0.5 MG/DL (ref 0.2–1.3)
BILIRUB UR QL STRIP: ABNORMAL
BUN SERPL-MCNC: 10 MG/DL (ref 7–30)
CALCIUM SERPL-MCNC: 9.2 MG/DL (ref 8.5–10.1)
CHLORIDE SERPL-SCNC: 106 MMOL/L (ref 94–109)
CO2 SERPL-SCNC: 21 MMOL/L (ref 20–32)
COLOR UR AUTO: ABNORMAL
CREAT SERPL-MCNC: 0.77 MG/DL (ref 0.52–1.04)
DIFFERENTIAL METHOD BLD: ABNORMAL
EOSINOPHIL # BLD AUTO: 0.2 10E9/L (ref 0–0.7)
EOSINOPHIL NFR BLD AUTO: 2.7 %
ERYTHROCYTE [DISTWIDTH] IN BLOOD BY AUTOMATED COUNT: 21.4 % (ref 10–15)
GFR SERPL CREATININE-BSD FRML MDRD: >90 ML/MIN/{1.73_M2}
GLUCOSE SERPL-MCNC: 89 MG/DL (ref 70–99)
GLUCOSE UR STRIP-MCNC: NEGATIVE MG/DL
HCT VFR BLD AUTO: 44.6 % (ref 35–47)
HGB BLD-MCNC: 14.1 G/DL (ref 11.7–15.7)
HGB UR QL STRIP: ABNORMAL
HYALINE CASTS #/AREA URNS LPF: 16 /LPF (ref 0–2)
IMM GRANULOCYTES # BLD: 0 10E9/L (ref 0–0.4)
IMM GRANULOCYTES NFR BLD: 0.3 %
KETONES UR STRIP-MCNC: 80 MG/DL
LEUKOCYTE ESTERASE UR QL STRIP: NEGATIVE
LYMPHOCYTES # BLD AUTO: 1.9 10E9/L (ref 0.8–5.3)
LYMPHOCYTES NFR BLD AUTO: 32.3 %
MCH RBC QN AUTO: 22.2 PG (ref 26.5–33)
MCHC RBC AUTO-ENTMCNC: 31.6 G/DL (ref 31.5–36.5)
MCV RBC AUTO: 70 FL (ref 78–100)
MONOCYTES # BLD AUTO: 0.8 10E9/L (ref 0–1.3)
MONOCYTES NFR BLD AUTO: 13.2 %
MUCOUS THREADS #/AREA URNS LPF: PRESENT /LPF
NEUTROPHILS # BLD AUTO: 3 10E9/L (ref 1.6–8.3)
NEUTROPHILS NFR BLD AUTO: 50.7 %
NITRATE UR QL: NEGATIVE
NRBC # BLD AUTO: 0 10*3/UL
NRBC BLD AUTO-RTO: 0 /100
PH UR STRIP: 6 PH (ref 5–7)
PLATELET # BLD AUTO: 298 10E9/L (ref 150–450)
POTASSIUM SERPL-SCNC: 3.5 MMOL/L (ref 3.4–5.3)
PROT SERPL-MCNC: 7.6 G/DL (ref 6.8–8.8)
RBC # BLD AUTO: 6.36 10E12/L (ref 3.8–5.2)
RBC #/AREA URNS AUTO: 5 /HPF (ref 0–2)
SODIUM SERPL-SCNC: 140 MMOL/L (ref 133–144)
SOURCE: ABNORMAL
SP GR UR STRIP: 1.03 (ref 1–1.03)
SQUAMOUS #/AREA URNS AUTO: 3 /HPF (ref 0–1)
UROBILINOGEN UR STRIP-MCNC: 0 MG/DL (ref 0–2)
WBC # BLD AUTO: 5.9 10E9/L (ref 4–11)
WBC #/AREA URNS AUTO: 4 /HPF (ref 0–5)

## 2018-12-30 PROCEDURE — 85025 COMPLETE CBC W/AUTO DIFF WBC: CPT | Performed by: EMERGENCY MEDICINE

## 2018-12-30 PROCEDURE — 80053 COMPREHEN METABOLIC PANEL: CPT | Performed by: EMERGENCY MEDICINE

## 2018-12-30 PROCEDURE — 96374 THER/PROPH/DIAG INJ IV PUSH: CPT

## 2018-12-30 PROCEDURE — 99284 EMERGENCY DEPT VISIT MOD MDM: CPT | Mod: 25

## 2018-12-30 PROCEDURE — 25000128 H RX IP 250 OP 636: Performed by: EMERGENCY MEDICINE

## 2018-12-30 PROCEDURE — 81001 URINALYSIS AUTO W/SCOPE: CPT | Performed by: EMERGENCY MEDICINE

## 2018-12-30 PROCEDURE — 96361 HYDRATE IV INFUSION ADD-ON: CPT

## 2018-12-30 RX ORDER — ONDANSETRON 4 MG/1
4 TABLET, ORALLY DISINTEGRATING ORAL EVERY 8 HOURS PRN
Qty: 20 TABLET | Refills: 0 | Status: SHIPPED | OUTPATIENT
Start: 2018-12-30 | End: 2019-01-04

## 2018-12-30 RX ORDER — ONDANSETRON 2 MG/ML
4 INJECTION INTRAMUSCULAR; INTRAVENOUS ONCE
Status: COMPLETED | OUTPATIENT
Start: 2018-12-30 | End: 2018-12-30

## 2018-12-30 RX ORDER — SODIUM CHLORIDE 9 MG/ML
1000 INJECTION, SOLUTION INTRAVENOUS CONTINUOUS
Status: DISCONTINUED | OUTPATIENT
Start: 2018-12-30 | End: 2018-12-30 | Stop reason: HOSPADM

## 2018-12-30 RX ORDER — NICOTINE POLACRILEX 4 MG/1
20 GUM, CHEWING ORAL DAILY
Status: ON HOLD | COMMUNITY
End: 2024-05-27

## 2018-12-30 RX ADMIN — SODIUM CHLORIDE 1000 ML: 9 INJECTION, SOLUTION INTRAVENOUS at 09:33

## 2018-12-30 RX ADMIN — SODIUM CHLORIDE 1000 ML: 9 INJECTION, SOLUTION INTRAVENOUS at 10:51

## 2018-12-30 RX ADMIN — ONDANSETRON 4 MG: 2 INJECTION INTRAMUSCULAR; INTRAVENOUS at 09:34

## 2018-12-30 ASSESSMENT — ENCOUNTER SYMPTOMS
HEMATURIA: 0
ABDOMINAL PAIN: 0
VOMITING: 1
DIFFICULTY URINATING: 0
APPETITE CHANGE: 1
CHILLS: 0
DYSURIA: 0
BLOOD IN STOOL: 0
FEVER: 0
CONSTIPATION: 0
DIARRHEA: 0
NAUSEA: 1

## 2018-12-30 NOTE — ED PROVIDER NOTES
History     Chief Complaint:  Vomiting     HPI   Paz Uribe is a 28 year old female s/p laparoscopic gastric sleeve on 12/13/2018 with Dr. Short who presents for evaluation of vomiting. On Thursday (12/27), 3 days ago, the patient developed nausea and had 4 episodes of nonbloody emesis. Nausea improved slightly on Friday, but worsened again yesterday with another episode of nonbloody emesis. She reports decreased po intake over the last 3 days due to nausea and vomiting. She is on a full liquid diet and has only been been getting in about 30 oz of water daily, and not getting her full protein shake. She had zofran at home but ran out. She reports she is otherwise recovering well from surgery. She is having normal bowel movements. She denies any fevers, chills, urinary changes. No significant pain in her abdomen. She is on omeprazole     Patient followed up with her general surgeon on 12/21 and was doing well at that time. She noted mild nausea at that time but no vomiting. Advised to drink 64 oz liquids daily, but she is only getting 30 oz in.     Kiko Short MD - 12/14/2018 4:40 PM CST  Formatting of this note may be different from the original.  HOSPITAL DISCHARGE SUMMARY    Patient Name: Paz Uribe  YOB: 1990 Age: 28 y.o.  Medical Record Number: [id number]  Primary Physician: Clair Starr DO  Phone: [phone]  Admission Date: 12/13/2018  Discharge Date: 12/14/2018     She will be discharged from [facility] to home.    PRINCIPAL DISCHARGE DIAGNOSIS: Morbid Obesity    Principal Problem:  S/P laparoscopic sleeve gastrectomy, intra-op EGD  Active Problems:  Morbid obesity with BMI of 50.0-59.9, adult (HC)  Fatty liver  PCOS (polycystic ovarian syndrome)    BRIEF HOSPITAL COURSE: This 28 y.o. female was admitted for an elective laparoscopic sleeve gastrectomy. After surgery she had an uneventful recovery and was discharged home on POD  1.    PROCEDURES PERFORMED DURING HOSPITALIZATION: laparoscopic sleeve gastrectomy      Allergies:  No Known Allergies     Medications:    Omeprazole  Was taking Zofran, but ran out    Past Medical History:    Morbid obesity  Migraine  PCOS  Vitamin D deficiency    Past Surgical History:    Laparoscopic sleeve gastrectomy, intra-op EGD 12/13/2018    Family History:    History reviewed. No pertinent family history.     Social History:  Smoking status: Never smoker  Alcohol use: No  Marital Status:   [2]     Review of Systems   Constitutional: Positive for appetite change. Negative for chills and fever.   Gastrointestinal: Positive for nausea and vomiting. Negative for abdominal pain, blood in stool, constipation and diarrhea.   Genitourinary: Negative for difficulty urinating, dysuria and hematuria.   All other systems reviewed and are negative.    Presents to the ED reporting generalized weakness and vomiting. Had bariatric surgery on the 13th and had been doing well per patient report until Thursday when began vomiting. States has only been able to tolerate a few sips of water but has been unable to keep anything else down.     Physical Exam     Patient Vitals for the past 24 hrs:   BP Temp Temp src Pulse Resp SpO2   12/30/18 1130 113/69 -- -- 68 -- 98 %   12/30/18 1100 113/68 -- -- 66 -- 98 %   12/30/18 1030 119/83 -- -- -- -- --   12/30/18 1000 125/84 -- -- 67 -- 97 %   12/30/18 0930 131/86 -- -- -- -- 96 %   12/30/18 0919 129/89 97.7  F (36.5  C) Oral 81 14 95 %       Physical Exam  GEN: patient appears tired.   at the bedside.  HEAD: atraumatic, normocephalic  EYES: pupils reactive (3plus to 2plus), extraocular muscles intact, conjunctivae normal  ENT: TMs flat and white bilaterally, oropharynx normal with no erythema or exudate, mucus membranes moist  NECK: no cervical LAD, no meningeal signs  RESPIRATORY: no tachypnea, breath sounds clear to auscultation (no rales, wheezes, rhonchi), chest wall  nontender, normal phonation  CVS: normal S1/S2, no murmurs/rubs/gallops  ABDOMEN: soft, nontender, no masses or organomegaly, no rebound, decreased bowel sounds, trocar sites appear well, no peritoneal signs  BACK: no costovertebral angle tenderness  EXTREMITIES: intact pulses x 4, full range of motion at joints, no edema  MUSCULOSKELETAL: no deformities  SKIN: warm and dry, no acute rashes  NEURO: GCS 15, cranial nerves intact.  Motor- moves all 4 extremities  Sensation- intact.  Coordination- amb ulatory.  Overall symmetrical exam  HEME: no bruising or petechiae/contusions  LYMPH: no lymphadenopathy      Emergency Department Course   Laboratory:  CBC: WBC 5.9, HGB 14.1,   CMP: WNL (Creatinine 0.77)    UA: Blood moderate, RBC 5(H), Bacteria Few, Squamous epithelial 3(H), Mucous present, Hyaline casts 16(H), Bilin small, ketones 80, Protein albumin 100, o/w negative    Interventions:  0934: NS 1L IV Bolus  0934: Zofran 4 mg IV  1204: NS 1L IV Bolus  [place]  Cardiac/Sp02 monitoring    Emergency Department Course:  Past medical records, nursing notes, and vitals reviewed.  0925: I performed an exam of the patient and obtained history, as documented above  IV inserted and blood drawn.    1145AM: Rechecked patient. Another L fluids ordered.     Discussed results with patient.  Gave patient copies of all results (applicable labs).  Answered questions.  Asked patient to followup with PCP.    /83   Pulse 67   Temp 97.7  F (36.5  C) (Oral)   Resp 14   SpO2 99%       Impression & Plan      Medical Decision Making:  Paz Uribe is a 28 year old female who had a gastric sleeve just performed recently and has been vomiting and nauseous and ran out of her zofran. IV was placed and labs sent.  No peritoneal signs on the examination.  The patient was given IV fluids with Zofran. She was dehydrated. She was able to urinate for us after a liter of fluids and still had 80 of ketones so a second liter of  fluids were given IV. Her white cell count is otherwise normal. Her abdomen is soft.     She is not tachycardic or hypotensive and I think we can send her home with refills of her Zofran. She will follow up. She understands the reasons to return such as increasing nausea, vomiting, fevers, chills, chest pain, shortness of breath.  Patient is much improved and tolerating a PO challenge.    Diagnosis:    ICD-10-CM    1. Dehydration E86.0    2. Nausea and vomiting, intractability of vomiting not specified, unspecified vomiting type R11.2        Disposition: Discharged to home    Discharge Medications:     Medication List      Started    ondansetron 4 MG ODT tab  Commonly known as:  ZOFRAN ODT  4 mg, Oral, EVERY 8 HOURS PRN              Zehra Adan  12/30/2018   [facility] EMERGENCY DEPARTMENT    IZehra, am serving as a scribe at 9:25 AM on 12/30/2018 to document services personally performed by Simona Luna MD based on my observations and the provider's statements to me.        Simona Luna MD  01/01/19 1916

## 2018-12-30 NOTE — ED TRIAGE NOTES
Presents to the ED reporting generalized weakness and vomiting. Had bariatric surgery on the 13th and had been doing well per patient report until Thursday when began vomiting. States has only been able to tolerate a few sips of water but has been unable to keep anything else down.

## 2018-12-30 NOTE — ED AVS SNAPSHOT
[facility] Emergency Department  [address]  [facility] [address]  Phone:  [phone]  Fax:  [phone]                                    Paz Uribe   MRN: [id number]    Department:  [facility] Emergency Department   Date of Visit:  12/30/2018           After Visit Summary Signature Page    I have received my discharge instructions, and my questions have been answered. I have discussed any challenges I see with this plan with the nurse or doctor.    ..........................................................................................................................................  Patient/Patient Representative Signature      ..........................................................................................................................................  Patient Representative Print Name and Relationship to Patient    ..................................................               ................................................  Date                                   Time    ..........................................................................................................................................  Reviewed by Signature/Title    ...................................................              ..............................................  Date                                               Time          22EPIC Rev 08/18

## 2023-12-18 LAB
HEPATITIS B SURFACE ANTIGEN (EXTERNAL): NEGATIVE
HIV1+2 AB SERPL QL IA: NEGATIVE
RUBELLA ANTIBODY IGG (EXTERNAL): NORMAL

## 2024-01-29 ENCOUNTER — HOSPITAL ENCOUNTER (EMERGENCY)
Facility: CLINIC | Age: 34
Discharge: HOME | End: 2024-01-29
Attending: EMERGENCY MEDICINE | Admitting: EMERGENCY MEDICINE
Payer: COMMERCIAL

## 2024-01-29 VITALS
DIASTOLIC BLOOD PRESSURE: 81 MMHG | TEMPERATURE: 98.1 F | RESPIRATION RATE: 16 BRPM | HEIGHT: 68 IN | BODY MASS INDEX: 37.13 KG/M2 | WEIGHT: 245 LBS | SYSTOLIC BLOOD PRESSURE: 135 MMHG | OXYGEN SATURATION: 100 % | HEART RATE: 93 BPM

## 2024-01-29 DIAGNOSIS — T74.91XA DOMESTIC VIOLENCE OF ADULT, INITIAL ENCOUNTER: ICD-10-CM

## 2024-01-29 DIAGNOSIS — Z33.1 PREGNANT STATE, INCIDENTAL: ICD-10-CM

## 2024-01-29 DIAGNOSIS — S09.90XA CLOSED HEAD INJURY, INITIAL ENCOUNTER: ICD-10-CM

## 2024-01-29 PROCEDURE — 99284 EMERGENCY DEPT VISIT MOD MDM: CPT | Mod: 25

## 2024-01-29 PROCEDURE — 76815 OB US LIMITED FETUS(S): CPT

## 2024-01-29 ASSESSMENT — ACTIVITIES OF DAILY LIVING (ADL): ADLS_ACUITY_SCORE: 35
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
## 2024-03-23 LAB — URINE CULTURE OB NURSE INTERPRETATION (EXTERNAL RESULT): POSITIVE

## 2024-05-27 ENCOUNTER — HOSPITAL ENCOUNTER (INPATIENT)
Facility: CLINIC | Age: 34
LOS: 12 days | Discharge: HOME HEALTH | End: 2024-06-08
Attending: OBSTETRICS & GYNECOLOGY | Admitting: OBSTETRICS & GYNECOLOGY
Payer: COMMERCIAL

## 2024-05-27 ENCOUNTER — APPOINTMENT (OUTPATIENT)
Dept: ULTRASOUND IMAGING | Facility: CLINIC | Age: 34
End: 2024-05-27
Attending: OBSTETRICS & GYNECOLOGY
Payer: COMMERCIAL

## 2024-05-27 DIAGNOSIS — O13.9 GESTATIONAL HYPERTENSION, ANTEPARTUM: ICD-10-CM

## 2024-05-27 PROBLEM — Z36.89 ENCOUNTER FOR TRIAGE IN PREGNANT PATIENT: Status: ACTIVE | Noted: 2024-05-27

## 2024-05-27 PROBLEM — O42.90 ROM (RUPTURE OF MEMBRANES), PREMATURE: Status: ACTIVE | Noted: 2024-05-27

## 2024-05-27 LAB
ABO/RH(D): NORMAL
ALBUMIN SERPL BCG-MCNC: 3.3 G/DL (ref 3.5–5.2)
ALBUMIN UR-MCNC: NEGATIVE MG/DL
ALP SERPL-CCNC: 88 U/L (ref 40–150)
ALT SERPL W P-5'-P-CCNC: 60 U/L (ref 0–50)
AMPHETAMINES UR QL SCN: NORMAL
ANION GAP SERPL CALCULATED.3IONS-SCNC: 12 MMOL/L (ref 7–15)
ANTIBODY SCREEN: NEGATIVE
APPEARANCE UR: CLEAR
AST SERPL W P-5'-P-CCNC: 27 U/L (ref 0–45)
BARBITURATES UR QL SCN: NORMAL
BENZODIAZ UR QL SCN: NORMAL
BILIRUB SERPL-MCNC: 0.2 MG/DL
BILIRUB UR QL STRIP: NEGATIVE
BUN SERPL-MCNC: 7.5 MG/DL (ref 6–20)
BZE UR QL SCN: NORMAL
C TRACH DNA SPEC QL PROBE+SIG AMP: NEGATIVE
CALCIUM SERPL-MCNC: 8.7 MG/DL (ref 8.6–10)
CANNABINOIDS UR QL SCN: NORMAL
CHLORIDE SERPL-SCNC: 105 MMOL/L (ref 98–107)
CLUE CELLS: ABNORMAL
COLOR UR AUTO: ABNORMAL
CREAT SERPL-MCNC: 0.57 MG/DL (ref 0.51–0.95)
CRYSTALS AMN MICRO: ABNORMAL
DEPRECATED HCO3 PLAS-SCNC: 20 MMOL/L (ref 22–29)
EGFRCR SERPLBLD CKD-EPI 2021: >90 ML/MIN/1.73M2
ERYTHROCYTE [DISTWIDTH] IN BLOOD BY AUTOMATED COUNT: 12.7 % (ref 10–15)
FENTANYL UR QL: NORMAL
GLUCOSE SERPL-MCNC: 74 MG/DL (ref 70–99)
GLUCOSE UR STRIP-MCNC: NEGATIVE MG/DL
HCT VFR BLD AUTO: 37.9 % (ref 35–47)
HGB BLD-MCNC: 13.2 G/DL (ref 11.7–15.7)
HGB UR QL STRIP: NEGATIVE
KETONES UR STRIP-MCNC: NEGATIVE MG/DL
LEUKOCYTE ESTERASE UR QL STRIP: NEGATIVE
MCH RBC QN AUTO: 30.3 PG (ref 26.5–33)
MCHC RBC AUTO-ENTMCNC: 34.8 G/DL (ref 31.5–36.5)
MCV RBC AUTO: 87 FL (ref 78–100)
MUCOUS THREADS #/AREA URNS LPF: PRESENT /LPF
N GONORRHOEA DNA SPEC QL NAA+PROBE: NEGATIVE
NITRATE UR QL: NEGATIVE
OPIATES UR QL SCN: NORMAL
PCP QUAL URINE (ROCHE): NORMAL
PH UR STRIP: 6.5 [PH] (ref 5–7)
PLATELET # BLD AUTO: 308 10E3/UL (ref 150–450)
POTASSIUM SERPL-SCNC: 4 MMOL/L (ref 3.4–5.3)
PROT SERPL-MCNC: 6.4 G/DL (ref 6.4–8.3)
RBC # BLD AUTO: 4.36 10E6/UL (ref 3.8–5.2)
RBC URINE: 1 /HPF
SARS-COV-2 RNA RESP QL NAA+PROBE: NEGATIVE
SODIUM SERPL-SCNC: 137 MMOL/L (ref 135–145)
SP GR UR STRIP: 1.02 (ref 1–1.03)
SPECIMEN EXPIRATION DATE: NORMAL
SQUAMOUS EPITHELIAL: 2 /HPF
TRICHOMONAS, WET PREP: ABNORMAL
UROBILINOGEN UR STRIP-MCNC: NORMAL MG/DL
WBC # BLD AUTO: 11.2 10E3/UL (ref 4–11)
WBC URINE: 2 /HPF
WBC'S/HIGH POWER FIELD, WET PREP: ABNORMAL
YEAST, WET PREP: ABNORMAL

## 2024-05-27 PROCEDURE — 86900 BLOOD TYPING SEROLOGIC ABO: CPT | Performed by: OBSTETRICS & GYNECOLOGY

## 2024-05-27 PROCEDURE — 258N000003 HC RX IP 258 OP 636: Performed by: OBSTETRICS & GYNECOLOGY

## 2024-05-27 PROCEDURE — 250N000011 HC RX IP 250 OP 636: Performed by: OBSTETRICS & GYNECOLOGY

## 2024-05-27 PROCEDURE — G0463 HOSPITAL OUTPT CLINIC VISIT: HCPCS

## 2024-05-27 PROCEDURE — 81001 URINALYSIS AUTO W/SCOPE: CPT | Performed by: OBSTETRICS & GYNECOLOGY

## 2024-05-27 PROCEDURE — 87210 SMEAR WET MOUNT SALINE/INK: CPT | Performed by: OBSTETRICS & GYNECOLOGY

## 2024-05-27 PROCEDURE — 250N000013 HC RX MED GY IP 250 OP 250 PS 637: Performed by: OBSTETRICS & GYNECOLOGY

## 2024-05-27 PROCEDURE — 80307 DRUG TEST PRSMV CHEM ANLYZR: CPT | Performed by: OBSTETRICS & GYNECOLOGY

## 2024-05-27 PROCEDURE — 87491 CHLMYD TRACH DNA AMP PROBE: CPT | Performed by: OBSTETRICS & GYNECOLOGY

## 2024-05-27 PROCEDURE — 85027 COMPLETE CBC AUTOMATED: CPT | Performed by: OBSTETRICS & GYNECOLOGY

## 2024-05-27 PROCEDURE — 120N000001 HC R&B MED SURG/OB

## 2024-05-27 PROCEDURE — 87635 SARS-COV-2 COVID-19 AMP PRB: CPT | Performed by: OBSTETRICS & GYNECOLOGY

## 2024-05-27 PROCEDURE — 0UCC7ZZ EXTIRPATION OF MATTER FROM CERVIX, VIA NATURAL OR ARTIFICIAL OPENING: ICD-10-PCS | Performed by: OBSTETRICS & GYNECOLOGY

## 2024-05-27 PROCEDURE — 80053 COMPREHEN METABOLIC PANEL: CPT | Performed by: OBSTETRICS & GYNECOLOGY

## 2024-05-27 PROCEDURE — 76815 OB US LIMITED FETUS(S): CPT

## 2024-05-27 PROCEDURE — 86780 TREPONEMA PALLIDUM: CPT | Performed by: OBSTETRICS & GYNECOLOGY

## 2024-05-27 PROCEDURE — 99221 1ST HOSP IP/OBS SF/LOW 40: CPT | Performed by: NURSE PRACTITIONER

## 2024-05-27 RX ORDER — NALOXONE HYDROCHLORIDE 0.4 MG/ML
0.2 INJECTION, SOLUTION INTRAMUSCULAR; INTRAVENOUS; SUBCUTANEOUS
Status: DISCONTINUED | OUTPATIENT
Start: 2024-05-27 | End: 2024-05-27

## 2024-05-27 RX ORDER — KETOROLAC TROMETHAMINE 30 MG/ML
30 INJECTION, SOLUTION INTRAMUSCULAR; INTRAVENOUS
Status: DISCONTINUED | OUTPATIENT
Start: 2024-05-27 | End: 2024-05-27

## 2024-05-27 RX ORDER — CALCIUM GLUCONATE 94 MG/ML
1 INJECTION, SOLUTION INTRAVENOUS
Status: DISCONTINUED | OUTPATIENT
Start: 2024-05-27 | End: 2024-06-05 | Stop reason: HOSPADM

## 2024-05-27 RX ORDER — SODIUM CHLORIDE, SODIUM LACTATE, POTASSIUM CHLORIDE, CALCIUM CHLORIDE 600; 310; 30; 20 MG/100ML; MG/100ML; MG/100ML; MG/100ML
INJECTION, SOLUTION INTRAVENOUS CONTINUOUS
Status: DISCONTINUED | OUTPATIENT
Start: 2024-05-27 | End: 2024-05-29

## 2024-05-27 RX ORDER — IBUPROFEN 800 MG/1
800 TABLET, FILM COATED ORAL
Status: DISCONTINUED | OUTPATIENT
Start: 2024-05-27 | End: 2024-05-27

## 2024-05-27 RX ORDER — MISOPROSTOL 200 UG/1
400 TABLET ORAL
Status: DISCONTINUED | OUTPATIENT
Start: 2024-05-27 | End: 2024-05-27

## 2024-05-27 RX ORDER — OXYTOCIN/0.9 % SODIUM CHLORIDE 30/500 ML
340 PLASTIC BAG, INJECTION (ML) INTRAVENOUS CONTINUOUS PRN
Status: DISCONTINUED | OUTPATIENT
Start: 2024-05-27 | End: 2024-05-27

## 2024-05-27 RX ORDER — ONDANSETRON 2 MG/ML
4 INJECTION INTRAMUSCULAR; INTRAVENOUS EVERY 6 HOURS PRN
Status: CANCELLED | OUTPATIENT
Start: 2024-05-27

## 2024-05-27 RX ORDER — MISOPROSTOL 200 UG/1
800 TABLET ORAL
Status: DISCONTINUED | OUTPATIENT
Start: 2024-05-27 | End: 2024-05-27

## 2024-05-27 RX ORDER — MAGNESIUM SULFATE IN WATER 40 MG/ML
2 INJECTION, SOLUTION INTRAVENOUS CONTINUOUS
Status: DISCONTINUED | OUTPATIENT
Start: 2024-05-27 | End: 2024-05-27

## 2024-05-27 RX ORDER — MAGNESIUM SULFATE HEPTAHYDRATE 40 MG/ML
4 INJECTION, SOLUTION INTRAVENOUS ONCE
Status: COMPLETED | OUTPATIENT
Start: 2024-05-27 | End: 2024-05-27

## 2024-05-27 RX ORDER — PROCHLORPERAZINE 25 MG
25 SUPPOSITORY, RECTAL RECTAL EVERY 12 HOURS PRN
Status: CANCELLED | OUTPATIENT
Start: 2024-05-27

## 2024-05-27 RX ORDER — PROCHLORPERAZINE MALEATE 10 MG
10 TABLET ORAL EVERY 6 HOURS PRN
Status: DISCONTINUED | OUTPATIENT
Start: 2024-05-27 | End: 2024-05-27

## 2024-05-27 RX ORDER — NALOXONE HYDROCHLORIDE 0.4 MG/ML
0.4 INJECTION, SOLUTION INTRAMUSCULAR; INTRAVENOUS; SUBCUTANEOUS
Status: DISCONTINUED | OUTPATIENT
Start: 2024-05-27 | End: 2024-05-27

## 2024-05-27 RX ORDER — OXYTOCIN 10 [USP'U]/ML
10 INJECTION, SOLUTION INTRAMUSCULAR; INTRAVENOUS
Status: DISCONTINUED | OUTPATIENT
Start: 2024-05-27 | End: 2024-05-27

## 2024-05-27 RX ORDER — AZITHROMYCIN 250 MG/1
1000 TABLET, FILM COATED ORAL ONCE
Status: COMPLETED | OUTPATIENT
Start: 2024-05-27 | End: 2024-05-27

## 2024-05-27 RX ORDER — AMPICILLIN 2 G/1
2 INJECTION, POWDER, FOR SOLUTION INTRAVENOUS EVERY 6 HOURS
Status: DISCONTINUED | OUTPATIENT
Start: 2024-05-27 | End: 2024-05-27 | Stop reason: HOSPADM

## 2024-05-27 RX ORDER — METOCLOPRAMIDE 10 MG/1
10 TABLET ORAL EVERY 6 HOURS PRN
Status: CANCELLED | OUTPATIENT
Start: 2024-05-27

## 2024-05-27 RX ORDER — MAGNESIUM SULFATE IN WATER 40 MG/ML
2 INJECTION, SOLUTION INTRAVENOUS CONTINUOUS
Status: ACTIVE | OUTPATIENT
Start: 2024-05-27 | End: 2024-05-28

## 2024-05-27 RX ORDER — ONDANSETRON 2 MG/ML
4 INJECTION INTRAMUSCULAR; INTRAVENOUS EVERY 6 HOURS PRN
Status: DISCONTINUED | OUTPATIENT
Start: 2024-05-27 | End: 2024-05-27

## 2024-05-27 RX ORDER — BETAMETHASONE SODIUM PHOSPHATE AND BETAMETHASONE ACETATE 3; 3 MG/ML; MG/ML
12 INJECTION, SUSPENSION INTRA-ARTICULAR; INTRALESIONAL; INTRAMUSCULAR; SOFT TISSUE EVERY 24 HOURS
Status: COMPLETED | OUTPATIENT
Start: 2024-05-27 | End: 2024-05-28

## 2024-05-27 RX ORDER — AMOXICILLIN 250 MG/1
250 CAPSULE ORAL 3 TIMES DAILY
Status: DISCONTINUED | OUTPATIENT
Start: 2024-05-29 | End: 2024-05-27 | Stop reason: HOSPADM

## 2024-05-27 RX ORDER — BISACODYL 10 MG
10 SUPPOSITORY, RECTAL RECTAL DAILY PRN
Status: DISCONTINUED | OUTPATIENT
Start: 2024-05-29 | End: 2024-06-05 | Stop reason: HOSPADM

## 2024-05-27 RX ORDER — MAGNESIUM SULFATE HEPTAHYDRATE 40 MG/ML
2 INJECTION, SOLUTION INTRAVENOUS ONCE
Status: DISCONTINUED | OUTPATIENT
Start: 2024-05-27 | End: 2024-05-27 | Stop reason: HOSPADM

## 2024-05-27 RX ORDER — TRANEXAMIC ACID 10 MG/ML
1 INJECTION, SOLUTION INTRAVENOUS EVERY 30 MIN PRN
Status: DISCONTINUED | OUTPATIENT
Start: 2024-05-27 | End: 2024-05-27

## 2024-05-27 RX ORDER — CALCIUM GLUCONATE 94 MG/ML
1 INJECTION, SOLUTION INTRAVENOUS
Status: DISCONTINUED | OUTPATIENT
Start: 2024-05-27 | End: 2024-05-27 | Stop reason: HOSPADM

## 2024-05-27 RX ORDER — ACETAMINOPHEN 325 MG/1
650 TABLET ORAL EVERY 4 HOURS PRN
Status: DISCONTINUED | OUTPATIENT
Start: 2024-05-27 | End: 2024-05-27

## 2024-05-27 RX ORDER — LIDOCAINE 40 MG/G
CREAM TOPICAL
Status: DISCONTINUED | OUTPATIENT
Start: 2024-05-27 | End: 2024-05-27 | Stop reason: HOSPADM

## 2024-05-27 RX ORDER — METOCLOPRAMIDE HYDROCHLORIDE 5 MG/ML
10 INJECTION INTRAMUSCULAR; INTRAVENOUS EVERY 6 HOURS PRN
Status: DISCONTINUED | OUTPATIENT
Start: 2024-05-27 | End: 2024-05-27

## 2024-05-27 RX ORDER — METOCLOPRAMIDE 10 MG/1
10 TABLET ORAL EVERY 6 HOURS PRN
Status: DISCONTINUED | OUTPATIENT
Start: 2024-05-27 | End: 2024-05-27

## 2024-05-27 RX ORDER — MAGNESIUM SULFATE HEPTAHYDRATE 40 MG/ML
2 INJECTION, SOLUTION INTRAVENOUS ONCE
Status: COMPLETED | OUTPATIENT
Start: 2024-05-27 | End: 2024-05-27

## 2024-05-27 RX ORDER — BETAMETHASONE SODIUM PHOSPHATE AND BETAMETHASONE ACETATE 3; 3 MG/ML; MG/ML
12 INJECTION, SUSPENSION INTRA-ARTICULAR; INTRALESIONAL; INTRAMUSCULAR; SOFT TISSUE EVERY 24 HOURS
Status: DISCONTINUED | OUTPATIENT
Start: 2024-05-27 | End: 2024-05-27 | Stop reason: HOSPADM

## 2024-05-27 RX ORDER — SIMETHICONE 80 MG
160 TABLET,CHEWABLE ORAL EVERY 4 HOURS PRN
Status: DISCONTINUED | OUTPATIENT
Start: 2024-05-27 | End: 2024-06-05 | Stop reason: HOSPADM

## 2024-05-27 RX ORDER — MAGNESIUM SULFATE IN WATER 40 MG/ML
2 INJECTION, SOLUTION INTRAVENOUS CONTINUOUS
Status: DISCONTINUED | OUTPATIENT
Start: 2024-05-27 | End: 2024-05-27 | Stop reason: HOSPADM

## 2024-05-27 RX ORDER — PRENATAL VIT/IRON FUM/FOLIC AC 27MG-0.8MG
1 TABLET ORAL DAILY
Status: DISCONTINUED | OUTPATIENT
Start: 2024-05-27 | End: 2024-06-08 | Stop reason: HOSPADM

## 2024-05-27 RX ORDER — METOCLOPRAMIDE HYDROCHLORIDE 5 MG/ML
10 INJECTION INTRAMUSCULAR; INTRAVENOUS EVERY 6 HOURS PRN
Status: CANCELLED | OUTPATIENT
Start: 2024-05-27

## 2024-05-27 RX ORDER — AMOXICILLIN 250 MG/1
250 CAPSULE ORAL 3 TIMES DAILY
Status: DISCONTINUED | OUTPATIENT
Start: 2024-05-29 | End: 2024-05-28

## 2024-05-27 RX ORDER — AMPICILLIN 2 G/1
2 INJECTION, POWDER, FOR SOLUTION INTRAVENOUS EVERY 6 HOURS
Status: COMPLETED | OUTPATIENT
Start: 2024-05-27 | End: 2024-05-29

## 2024-05-27 RX ORDER — ONDANSETRON 4 MG/1
4 TABLET, ORALLY DISINTEGRATING ORAL EVERY 6 HOURS PRN
Status: CANCELLED | OUTPATIENT
Start: 2024-05-27

## 2024-05-27 RX ORDER — CARBOPROST TROMETHAMINE 250 UG/ML
250 INJECTION, SOLUTION INTRAMUSCULAR
Status: DISCONTINUED | OUTPATIENT
Start: 2024-05-27 | End: 2024-05-27

## 2024-05-27 RX ORDER — AZITHROMYCIN 250 MG/1
1000 TABLET, FILM COATED ORAL ONCE
Status: DISCONTINUED | OUTPATIENT
Start: 2024-05-27 | End: 2024-05-27 | Stop reason: HOSPADM

## 2024-05-27 RX ORDER — ASPIRIN 81 MG/1
81 TABLET, CHEWABLE ORAL DAILY
Status: DISCONTINUED | OUTPATIENT
Start: 2024-05-27 | End: 2024-05-30

## 2024-05-27 RX ORDER — ACETAMINOPHEN 325 MG/1
650 TABLET ORAL EVERY 4 HOURS PRN
Status: DISCONTINUED | OUTPATIENT
Start: 2024-05-27 | End: 2024-06-05 | Stop reason: HOSPADM

## 2024-05-27 RX ORDER — ASPIRIN 81 MG/1
81 TABLET, CHEWABLE ORAL DAILY
Status: ON HOLD | COMMUNITY
End: 2024-06-06

## 2024-05-27 RX ORDER — ONDANSETRON 4 MG/1
4 TABLET, ORALLY DISINTEGRATING ORAL EVERY 6 HOURS PRN
Status: DISCONTINUED | OUTPATIENT
Start: 2024-05-27 | End: 2024-05-27

## 2024-05-27 RX ORDER — DIPHENHYDRAMINE HCL 25 MG
25 CAPSULE ORAL EVERY 6 HOURS PRN
Status: DISCONTINUED | OUTPATIENT
Start: 2024-05-27 | End: 2024-06-05 | Stop reason: HOSPADM

## 2024-05-27 RX ORDER — MAGNESIUM SULFATE HEPTAHYDRATE 40 MG/ML
4 INJECTION, SOLUTION INTRAVENOUS ONCE
Status: DISCONTINUED | OUTPATIENT
Start: 2024-05-27 | End: 2024-05-27 | Stop reason: HOSPADM

## 2024-05-27 RX ORDER — METOCLOPRAMIDE HYDROCHLORIDE 5 MG/ML
10 INJECTION INTRAMUSCULAR; INTRAVENOUS EVERY 6 HOURS PRN
Status: DISCONTINUED | OUTPATIENT
Start: 2024-05-27 | End: 2024-06-05 | Stop reason: HOSPADM

## 2024-05-27 RX ORDER — DIPHENHYDRAMINE HYDROCHLORIDE 50 MG/ML
25 INJECTION INTRAMUSCULAR; INTRAVENOUS EVERY 6 HOURS PRN
Status: DISCONTINUED | OUTPATIENT
Start: 2024-05-27 | End: 2024-06-05 | Stop reason: HOSPADM

## 2024-05-27 RX ORDER — SODIUM CHLORIDE, SODIUM LACTATE, POTASSIUM CHLORIDE, CALCIUM CHLORIDE 600; 310; 30; 20 MG/100ML; MG/100ML; MG/100ML; MG/100ML
INJECTION, SOLUTION INTRAVENOUS CONTINUOUS
Status: DISCONTINUED | OUTPATIENT
Start: 2024-05-27 | End: 2024-05-27

## 2024-05-27 RX ORDER — PROCHLORPERAZINE 25 MG
25 SUPPOSITORY, RECTAL RECTAL EVERY 12 HOURS PRN
Status: DISCONTINUED | OUTPATIENT
Start: 2024-05-27 | End: 2024-06-05 | Stop reason: HOSPADM

## 2024-05-27 RX ORDER — METOCLOPRAMIDE 10 MG/1
10 TABLET ORAL EVERY 6 HOURS PRN
Status: DISCONTINUED | OUTPATIENT
Start: 2024-05-27 | End: 2024-06-05 | Stop reason: HOSPADM

## 2024-05-27 RX ORDER — HYDROXYZINE HYDROCHLORIDE 50 MG/1
100 TABLET, FILM COATED ORAL
Status: DISCONTINUED | OUTPATIENT
Start: 2024-05-27 | End: 2024-06-05 | Stop reason: HOSPADM

## 2024-05-27 RX ORDER — POLYETHYLENE GLYCOL 3350 17 G/17G
17 POWDER, FOR SOLUTION ORAL DAILY
Status: DISCONTINUED | OUTPATIENT
Start: 2024-05-27 | End: 2024-06-05 | Stop reason: HOSPADM

## 2024-05-27 RX ORDER — PROCHLORPERAZINE 25 MG
25 SUPPOSITORY, RECTAL RECTAL EVERY 12 HOURS PRN
Status: DISCONTINUED | OUTPATIENT
Start: 2024-05-27 | End: 2024-05-27

## 2024-05-27 RX ORDER — METHYLERGONOVINE MALEATE 0.2 MG/ML
200 INJECTION INTRAVENOUS
Status: DISCONTINUED | OUTPATIENT
Start: 2024-05-27 | End: 2024-05-27

## 2024-05-27 RX ORDER — FENTANYL CITRATE 50 UG/ML
100 INJECTION, SOLUTION INTRAMUSCULAR; INTRAVENOUS
Status: DISCONTINUED | OUTPATIENT
Start: 2024-05-27 | End: 2024-05-27

## 2024-05-27 RX ORDER — OXYTOCIN/0.9 % SODIUM CHLORIDE 30/500 ML
100-340 PLASTIC BAG, INJECTION (ML) INTRAVENOUS CONTINUOUS PRN
Status: DISCONTINUED | OUTPATIENT
Start: 2024-05-27 | End: 2024-05-27

## 2024-05-27 RX ORDER — MAGNESIUM HYDROXIDE/ALUMINUM HYDROXICE/SIMETHICONE 120; 1200; 1200 MG/30ML; MG/30ML; MG/30ML
30 SUSPENSION ORAL
Status: DISCONTINUED | OUTPATIENT
Start: 2024-05-27 | End: 2024-06-05 | Stop reason: HOSPADM

## 2024-05-27 RX ORDER — PROCHLORPERAZINE MALEATE 10 MG
10 TABLET ORAL EVERY 6 HOURS PRN
Status: DISCONTINUED | OUTPATIENT
Start: 2024-05-27 | End: 2024-06-05 | Stop reason: HOSPADM

## 2024-05-27 RX ORDER — PROCHLORPERAZINE MALEATE 10 MG
10 TABLET ORAL EVERY 6 HOURS PRN
Status: CANCELLED | OUTPATIENT
Start: 2024-05-27

## 2024-05-27 RX ORDER — LOPERAMIDE HCL 2 MG
2 CAPSULE ORAL
Status: DISCONTINUED | OUTPATIENT
Start: 2024-05-27 | End: 2024-05-27

## 2024-05-27 RX ORDER — ONDANSETRON 4 MG/1
4 TABLET, ORALLY DISINTEGRATING ORAL EVERY 6 HOURS PRN
Status: DISCONTINUED | OUTPATIENT
Start: 2024-05-27 | End: 2024-06-05 | Stop reason: HOSPADM

## 2024-05-27 RX ORDER — PENICILLIN G POTASSIUM 5000000 [IU]/1
5 INJECTION, POWDER, FOR SOLUTION INTRAMUSCULAR; INTRAVENOUS ONCE
Status: DISCONTINUED | OUTPATIENT
Start: 2024-05-27 | End: 2024-05-27

## 2024-05-27 RX ORDER — ONDANSETRON 2 MG/ML
4 INJECTION INTRAMUSCULAR; INTRAVENOUS EVERY 6 HOURS PRN
Status: DISCONTINUED | OUTPATIENT
Start: 2024-05-27 | End: 2024-06-05 | Stop reason: HOSPADM

## 2024-05-27 RX ORDER — CITRIC ACID/SODIUM CITRATE 334-500MG
30 SOLUTION, ORAL ORAL
Status: DISCONTINUED | OUTPATIENT
Start: 2024-05-27 | End: 2024-05-27

## 2024-05-27 RX ORDER — PENICILLIN G 3000000 [IU]/50ML
3 INJECTION, SOLUTION INTRAVENOUS EVERY 4 HOURS
Status: DISCONTINUED | OUTPATIENT
Start: 2024-05-27 | End: 2024-05-27

## 2024-05-27 RX ORDER — LOPERAMIDE HCL 2 MG
4 CAPSULE ORAL
Status: DISCONTINUED | OUTPATIENT
Start: 2024-05-27 | End: 2024-05-27

## 2024-05-27 RX ADMIN — ASPIRIN 81 MG CHEWABLE TABLET 81 MG: 81 TABLET CHEWABLE at 09:26

## 2024-05-27 RX ADMIN — MAGNESIUM SULFATE IN WATER 2 G/HR: 40 INJECTION, SOLUTION INTRAVENOUS at 06:54

## 2024-05-27 RX ADMIN — AMPICILLIN SODIUM 2 G: 2 INJECTION, POWDER, FOR SOLUTION INTRAMUSCULAR; INTRAVENOUS at 18:12

## 2024-05-27 RX ADMIN — SODIUM CHLORIDE, POTASSIUM CHLORIDE, SODIUM LACTATE AND CALCIUM CHLORIDE: 600; 310; 30; 20 INJECTION, SOLUTION INTRAVENOUS at 05:49

## 2024-05-27 RX ADMIN — BETAMETHASONE SODIUM PHOSPHATE AND BETAMETHASONE ACETATE 12 MG: 3; 3 INJECTION, SUSPENSION INTRA-ARTICULAR; INTRALESIONAL; INTRAMUSCULAR at 06:07

## 2024-05-27 RX ADMIN — AZITHROMYCIN DIHYDRATE 1000 MG: 250 TABLET ORAL at 06:08

## 2024-05-27 RX ADMIN — AMPICILLIN SODIUM 2 G: 2 INJECTION, POWDER, FOR SOLUTION INTRAMUSCULAR; INTRAVENOUS at 11:52

## 2024-05-27 RX ADMIN — AMPICILLIN SODIUM 2 G: 2 INJECTION, POWDER, FOR SOLUTION INTRAMUSCULAR; INTRAVENOUS at 23:43

## 2024-05-27 RX ADMIN — PRENATAL VITAMINS-IRON FUMARATE 27 MG IRON-FOLIC ACID 0.8 MG TABLET 1 TABLET: at 09:26

## 2024-05-27 RX ADMIN — MAGNESIUM SULFATE HEPTAHYDRATE 4 G: 4 INJECTION, SOLUTION INTRAVENOUS at 06:20

## 2024-05-27 RX ADMIN — AMPICILLIN SODIUM 2 G: 2 INJECTION, POWDER, FOR SOLUTION INTRAMUSCULAR; INTRAVENOUS at 06:08

## 2024-05-27 RX ADMIN — SODIUM CHLORIDE, POTASSIUM CHLORIDE, SODIUM LACTATE AND CALCIUM CHLORIDE 75 ML/HR: 600; 310; 30; 20 INJECTION, SOLUTION INTRAVENOUS at 11:49

## 2024-05-27 RX ADMIN — MAGNESIUM SULFATE HEPTAHYDRATE 2 G: 2 INJECTION, SOLUTION INTRAVENOUS at 06:42

## 2024-05-27 ASSESSMENT — ACTIVITIES OF DAILY LIVING (ADL)
ADLS_ACUITY_SCORE: 18
ADLS_ACUITY_SCORE: 18
ADLS_ACUITY_SCORE: 19
ADLS_ACUITY_SCORE: 18
ADLS_ACUITY_SCORE: 19
ADLS_ACUITY_SCORE: 18
ADLS_ACUITY_SCORE: 18
ADLS_ACUITY_SCORE: 19
ADLS_ACUITY_SCORE: 18
ADLS_ACUITY_SCORE: 19
ADLS_ACUITY_SCORE: 18
ADLS_ACUITY_SCORE: 19
ADLS_ACUITY_SCORE: 19

## 2024-05-27 NOTE — PROVIDER NOTIFICATION
05/27/24 0830   Provider Notification   Provider Name/Title Dr. Jenkins   Method of Notification At Bedside     Dr. Jenkins at bedside. Per MD, pt is OK to have a regular diet. MD discussed potential for emergent c/s and need for blood transfusion, questions answered.

## 2024-05-27 NOTE — PROVIDER NOTIFICATION
05/27/24 0517   Provider Notification   Provider Name/Title Dr. Alonzo   Method of Notification Phone   Request Evaluate - Remote   Notification Reason Status Update       MD called back and notified that pooling was positive on spec exam, lots of clear fluid noted. Specimens sent and pending, working on placing IV, US ordered.     Per MD cancel GBS swab as she was positive in her urine earlier in pregnancy. TORB for PPROM order set including betamethasone and magnesium sulfate for neuroprotection and admission orders. MD will make her way in to remove cerclage.    done done/right breast

## 2024-05-27 NOTE — PROVIDER NOTIFICATION
Dr. Jenkins updated on patient condition. Patient no longer feels contractions and tracing is CAT 1. Per MD Mag to be stopped at 12 hours. Nurse to contact MD at 2100 for further instruction on continuous monitoring throughout the night.
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
## 2024-05-27 NOTE — PROVIDER NOTIFICATION
05/27/24 0636   Provider Notification   Provider Name/Title Dr. Alonzo   Method of Notification At Bedside   Request Evaluate in Person       MD at bedside to discuss POC, she will return shortly with Dr. Jenkins to assist in helping remove the cerclage.
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
## 2024-05-27 NOTE — PROVIDER NOTIFICATION
"   05/27/24 0703   Provider Notification   Provider Name/Title Dr. Alonzo/Dr. Jenkins   Method of Notification At Bedside     Dr. Alonzo and Dr. Jenkins at bedside for sterile speculum exam with cerclage removal. SVE visually ~1.5 cm. No vag bldg, pt states she is slightly \"crampy\". FHT's cat I tracing. Occ ctx seen on monitor. Continuing to leak small amount of clear fluid. She has received her first dose of Beta, antibiotics, and mag sulfate gtt. Continuous uterine and fetal monitoring monitoring while on mag gtt. Q4h vitals. Awaiting NNP consult. SO snoring at bedside.    Pt is agreeable with plan going forward, questions answered.  "
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
## 2024-05-27 NOTE — PROVIDER NOTIFICATION
05/27/24 0540   Provider Notification   Provider Name/Title Dr. Brito   Method of Notification Electronic Page   Request Evaluate - Remote   Notification Reason Other (Comment)  (Order clarification)     Per Dr. Brito, okay to hold PCN for GBS now unless clinical status changes. Pily Goddard, RN on 5/27/2024 at 5:43 AM

## 2024-05-27 NOTE — PROGRESS NOTES
PREOPERATIVE DIAGNOSIS: cerclage with PPROM    POSTOPERATIVE DIAGNOSIS: s/p cerclage removal with PPROM    PROCEDURE: removal of cerclage    SURGEON: Bridgett Jenkins DO and Kareen Alonzo MD      INDICATIONS: Paz Castañeda  at 30w3d with exam indicated cerclage with PPROM at 115 on .      FINDINGS: cervix visibly dilated to 1-2 cm     PROCEDURE: After informed consent was obtained, Paz Castañeda was placed in stirrups in her room.  A speculum placed in the vagina and the cervix was visualized.  The cerclage was grasped with a swan forceps and elevated. Scissors were used to cut one side of the suture and it was removed in one piece. The cervix appeared to be dilated 1-2 cm on visual exam.  There was scant bleeding at the suture removal site. Instruments were removed from the vagina and patient tolerated the proceed well.     ESTIMATED BLOOD LOSS: 5cc.    Bridgett Jenkins DO

## 2024-05-28 ENCOUNTER — APPOINTMENT (OUTPATIENT)
Dept: ULTRASOUND IMAGING | Facility: CLINIC | Age: 34
End: 2024-05-28
Attending: OBSTETRICS & GYNECOLOGY
Payer: COMMERCIAL

## 2024-05-28 LAB — T PALLIDUM AB SER QL: NONREACTIVE

## 2024-05-28 PROCEDURE — 76819 FETAL BIOPHYS PROFIL W/O NST: CPT

## 2024-05-28 PROCEDURE — 250N000011 HC RX IP 250 OP 636: Performed by: OBSTETRICS & GYNECOLOGY

## 2024-05-28 PROCEDURE — 90471 IMMUNIZATION ADMIN: CPT | Performed by: OBSTETRICS & GYNECOLOGY

## 2024-05-28 PROCEDURE — 76820 UMBILICAL ARTERY ECHO: CPT | Mod: 26 | Performed by: OBSTETRICS & GYNECOLOGY

## 2024-05-28 PROCEDURE — 99252 IP/OBS CONSLTJ NEW/EST SF 35: CPT | Mod: 25 | Performed by: OBSTETRICS & GYNECOLOGY

## 2024-05-28 PROCEDURE — 76819 FETAL BIOPHYS PROFIL W/O NST: CPT | Mod: 26 | Performed by: OBSTETRICS & GYNECOLOGY

## 2024-05-28 PROCEDURE — 76811 OB US DETAILED SNGL FETUS: CPT | Mod: 26 | Performed by: OBSTETRICS & GYNECOLOGY

## 2024-05-28 PROCEDURE — 90715 TDAP VACCINE 7 YRS/> IM: CPT | Performed by: OBSTETRICS & GYNECOLOGY

## 2024-05-28 PROCEDURE — 250N000013 HC RX MED GY IP 250 OP 250 PS 637: Performed by: OBSTETRICS & GYNECOLOGY

## 2024-05-28 PROCEDURE — 120N000001 HC R&B MED SURG/OB

## 2024-05-28 RX ORDER — AMOXICILLIN 250 MG/1
250 CAPSULE ORAL 3 TIMES DAILY
Status: COMPLETED | OUTPATIENT
Start: 2024-05-29 | End: 2024-06-02

## 2024-05-28 RX ADMIN — ASPIRIN 81 MG CHEWABLE TABLET 81 MG: 81 TABLET CHEWABLE at 09:45

## 2024-05-28 RX ADMIN — AMPICILLIN SODIUM 2 G: 2 INJECTION, POWDER, FOR SOLUTION INTRAMUSCULAR; INTRAVENOUS at 16:06

## 2024-05-28 RX ADMIN — PRENATAL VITAMINS-IRON FUMARATE 27 MG IRON-FOLIC ACID 0.8 MG TABLET 1 TABLET: at 09:45

## 2024-05-28 RX ADMIN — CLOSTRIDIUM TETANI TOXOID ANTIGEN (FORMALDEHYDE INACTIVATED), CORYNEBACTERIUM DIPHTHERIAE TOXOID ANTIGEN (FORMALDEHYDE INACTIVATED), BORDETELLA PERTUSSIS TOXOID ANTIGEN (GLUTARALDEHYDE INACTIVATED), BORDETELLA PERTUSSIS FILAMENTOUS HEMAGGLUTININ ANTIGEN (FORMALDEHYDE INACTIVATED), BORDETELLA PERTUSSIS PERTACTIN ANTIGEN, AND BORDETELLA PERTUSSIS FIMBRIAE 2/3 ANTIGEN 0.5 ML: 5; 2; 2.5; 5; 3; 5 INJECTION, SUSPENSION INTRAMUSCULAR at 18:17

## 2024-05-28 RX ADMIN — BETAMETHASONE SODIUM PHOSPHATE AND BETAMETHASONE ACETATE 12 MG: 3; 3 INJECTION, SUSPENSION INTRA-ARTICULAR; INTRALESIONAL; INTRAMUSCULAR at 06:24

## 2024-05-28 RX ADMIN — AMPICILLIN SODIUM 2 G: 2 INJECTION, POWDER, FOR SOLUTION INTRAMUSCULAR; INTRAVENOUS at 22:49

## 2024-05-28 RX ADMIN — AMPICILLIN SODIUM 2 G: 2 INJECTION, POWDER, FOR SOLUTION INTRAMUSCULAR; INTRAVENOUS at 06:25

## 2024-05-28 ASSESSMENT — ACTIVITIES OF DAILY LIVING (ADL)
ADLS_ACUITY_SCORE: 18

## 2024-05-28 NOTE — CONSULTS
"Maternal Fetal Medicine Consultation    The patient is a 32 yo  at 30w4d who presented to [facility] with PROM. She has had intermittent contractions. Her  course was complicated by need for a rescue cerclage at 21 weeks. The cerclage was removed yesterday.    The patient's PMHx is remarkable for obesity and prior gastric sleeve surgery.    A/P:  1) PROM - The patient has PROM without labor. The following is recommended:    - Complete latency antibiotics  - Complete BMZ  - NST BID  - BPP with UAR doppler (FGR) weekly  - Delivery for infection, labor, non-reassuring fetal status or at 34 weeks at the latest.  - Magnesium for neuroprotection if indicated    2) FGR - The US today showed the EFW at 21% however the AC was 9%. We will do a UAR doppler with the weekly BPP and also reassess fetal growth in 3 weeks.    3) Thromboprophylaxis - The patient is recommended to begin Lovenox after 72 hours if undelivered. The dose is 40 mg once daily for BMI < 40 and 40 mg twice daily if > 40 BMI.    Please see \"Imaging\" tab under \"Chart Review\" for details of today's US at the [facility].    Mick Warren MD  Maternal-Fetal Medicine    "

## 2024-05-28 NOTE — PLAN OF CARE
Pt was able to sleep overnight, noticed a few contractions, still none painful, notified RN of some pink fluid and spotting when up to the bathroom x1, beta course complete.       Problem: Adult Inpatient Plan of Care  Goal: Absence of Hospital-Acquired Illness or Injury  Outcome: Progressing  Intervention: Prevent Skin Injury  Recent Flowsheet Documentation  Taken 2024 by Gina Stockton RN  Body Position: position changed independently  Intervention: Prevent and Manage VTE (Venous Thromboembolism) Risk  Recent Flowsheet Documentation  Taken 2024 by Gina Stockton RN  VTE Prevention/Management: SCDs (sequential compression devices) on  Goal: Optimal Comfort and Wellbeing  Outcome: Progressing  Intervention: Provide Person-Centered Care  Recent Flowsheet Documentation  Taken 2024 by Gina Stockton RN  Trust Relationship/Rapport:   care explained   choices provided   empathic listening provided   emotional support provided   questions answered   questions encouraged   reassurance provided   thoughts/feelings acknowledged     Problem:  Labor  Goal: Delayed  Delivery  Outcome: Progressing  Intervention: Monitor and Manage  Labor  Recent Flowsheet Documentation  Taken 2024 by Gina Stockton RN  Body Position: position changed independently

## 2024-05-28 NOTE — PLAN OF CARE
Problem: Adult Inpatient Plan of Care  Goal: Plan of Care Review  Description: The Plan of Care Review/Shift note should be completed every shift.  The Outcome Evaluation is a brief statement about your assessment that the patient is improving, declining, or no change.  This information will be displayed automatically on your shift  note.  Outcome: Progressing  Flowsheets (Taken 5/27/2024 1908)  Plan of Care Reviewed With: patient  Overall Patient Progress: no change   Goal Outcome Evaluation:      Plan of Care Reviewed With: patient    Overall Patient Progress: no changeOverall Patient Progress: no change

## 2024-05-28 NOTE — PROVIDER NOTIFICATION
05/27/24 2102   Provider Notification   Provider Name/Title Dr. Jenkins   Method of Notification Phone   Request Evaluate - Remote   Notification Reason Status Update       Called MD for update as requested, pt is doing well, no bleeding, 2 contractions in the last 2 hours that felt like tightening to her, no pain, FHT category 1, slightly difficult to keep on the monitor.     TORB ok to move to TID NST for monitoring tonight so can come off the monitor when ready for bed and plan for NST with second betamethasone around 0600, AM RN to reach out to [facility] for consult in AM, bathroom privilages only at this time, continue SCDs
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
## 2024-05-28 NOTE — PROVIDER NOTIFICATION
05/28/24 1100   Provider Notification   Provider Name/Title Ríos   Method of Notification In Department   Request Evaluate - Remote   Notification Reason Other (Comment)  (Monitoring order)     New order for 1 hour monitoring q8h/TID.
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
## 2024-05-29 LAB
ALBUMIN SERPL BCG-MCNC: 3 G/DL (ref 3.5–5.2)
ALP SERPL-CCNC: 78 U/L (ref 40–150)
ALT SERPL W P-5'-P-CCNC: 67 U/L (ref 0–50)
ANION GAP SERPL CALCULATED.3IONS-SCNC: 13 MMOL/L (ref 7–15)
AST SERPL W P-5'-P-CCNC: 36 U/L (ref 0–45)
BILIRUB SERPL-MCNC: 0.3 MG/DL
BUN SERPL-MCNC: 7.5 MG/DL (ref 6–20)
CALCIUM SERPL-MCNC: 8.2 MG/DL (ref 8.6–10)
CHLORIDE SERPL-SCNC: 107 MMOL/L (ref 98–107)
CREAT SERPL-MCNC: 0.67 MG/DL (ref 0.51–0.95)
DEPRECATED HCO3 PLAS-SCNC: 20 MMOL/L (ref 22–29)
EGFRCR SERPLBLD CKD-EPI 2021: >90 ML/MIN/1.73M2
ERYTHROCYTE [DISTWIDTH] IN BLOOD BY AUTOMATED COUNT: 12.9 % (ref 10–15)
GLUCOSE SERPL-MCNC: 86 MG/DL (ref 70–99)
HCT VFR BLD AUTO: 36.7 % (ref 35–47)
HGB BLD-MCNC: 12.3 G/DL (ref 11.7–15.7)
MCH RBC QN AUTO: 29.9 PG (ref 26.5–33)
MCHC RBC AUTO-ENTMCNC: 33.5 G/DL (ref 31.5–36.5)
MCV RBC AUTO: 89 FL (ref 78–100)
PLATELET # BLD AUTO: 282 10E3/UL (ref 150–450)
POTASSIUM SERPL-SCNC: 4 MMOL/L (ref 3.4–5.3)
PROT SERPL-MCNC: 5.8 G/DL (ref 6.4–8.3)
RBC # BLD AUTO: 4.12 10E6/UL (ref 3.8–5.2)
SODIUM SERPL-SCNC: 140 MMOL/L (ref 135–145)
WBC # BLD AUTO: 12.7 10E3/UL (ref 4–11)

## 2024-05-29 PROCEDURE — 250N000013 HC RX MED GY IP 250 OP 250 PS 637: Performed by: OBSTETRICS & GYNECOLOGY

## 2024-05-29 PROCEDURE — 36415 COLL VENOUS BLD VENIPUNCTURE: CPT | Performed by: OBSTETRICS & GYNECOLOGY

## 2024-05-29 PROCEDURE — 85027 COMPLETE CBC AUTOMATED: CPT | Performed by: OBSTETRICS & GYNECOLOGY

## 2024-05-29 PROCEDURE — 80053 COMPREHEN METABOLIC PANEL: CPT | Performed by: OBSTETRICS & GYNECOLOGY

## 2024-05-29 PROCEDURE — 120N000001 HC R&B MED SURG/OB

## 2024-05-29 PROCEDURE — 250N000011 HC RX IP 250 OP 636: Performed by: OBSTETRICS & GYNECOLOGY

## 2024-05-29 RX ADMIN — AMOXICILLIN 250 MG: 250 CAPSULE ORAL at 09:12

## 2024-05-29 RX ADMIN — AMOXICILLIN 250 MG: 250 CAPSULE ORAL at 22:03

## 2024-05-29 RX ADMIN — AMOXICILLIN 250 MG: 250 CAPSULE ORAL at 16:00

## 2024-05-29 RX ADMIN — ASPIRIN 81 MG CHEWABLE TABLET 81 MG: 81 TABLET CHEWABLE at 09:12

## 2024-05-29 RX ADMIN — HYDROXYZINE HYDROCHLORIDE 100 MG: 50 TABLET, FILM COATED ORAL at 22:03

## 2024-05-29 RX ADMIN — AMPICILLIN SODIUM 2 G: 2 INJECTION, POWDER, FOR SOLUTION INTRAMUSCULAR; INTRAVENOUS at 05:37

## 2024-05-29 RX ADMIN — POLYETHYLENE GLYCOL 3350 17 G: 17 POWDER, FOR SOLUTION ORAL at 09:13

## 2024-05-29 RX ADMIN — PRENATAL VITAMINS-IRON FUMARATE 27 MG IRON-FOLIC ACID 0.8 MG TABLET 1 TABLET: at 09:12

## 2024-05-29 ASSESSMENT — ACTIVITIES OF DAILY LIVING (ADL)
ADLS_ACUITY_SCORE: 18
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
## 2024-05-30 LAB
ABO/RH(D): NORMAL
ANTIBODY SCREEN: NEGATIVE
SPECIMEN EXPIRATION DATE: NORMAL

## 2024-05-30 PROCEDURE — 250N000011 HC RX IP 250 OP 636: Performed by: OBSTETRICS & GYNECOLOGY

## 2024-05-30 PROCEDURE — 36415 COLL VENOUS BLD VENIPUNCTURE: CPT | Performed by: OBSTETRICS & GYNECOLOGY

## 2024-05-30 PROCEDURE — 86900 BLOOD TYPING SEROLOGIC ABO: CPT | Performed by: OBSTETRICS & GYNECOLOGY

## 2024-05-30 PROCEDURE — 120N000001 HC R&B MED SURG/OB

## 2024-05-30 PROCEDURE — 250N000013 HC RX MED GY IP 250 OP 250 PS 637: Performed by: OBSTETRICS & GYNECOLOGY

## 2024-05-30 RX ORDER — ENOXAPARIN SODIUM 100 MG/ML
40 INJECTION SUBCUTANEOUS EVERY 24 HOURS
Status: DISCONTINUED | OUTPATIENT
Start: 2024-05-30 | End: 2024-06-05

## 2024-05-30 RX ADMIN — ASPIRIN 81 MG CHEWABLE TABLET 81 MG: 81 TABLET CHEWABLE at 08:29

## 2024-05-30 RX ADMIN — PRENATAL VITAMINS-IRON FUMARATE 27 MG IRON-FOLIC ACID 0.8 MG TABLET 1 TABLET: at 08:29

## 2024-05-30 RX ADMIN — AMOXICILLIN 250 MG: 250 CAPSULE ORAL at 22:04

## 2024-05-30 RX ADMIN — AMOXICILLIN 250 MG: 250 CAPSULE ORAL at 08:29

## 2024-05-30 RX ADMIN — AMOXICILLIN 250 MG: 250 CAPSULE ORAL at 16:25

## 2024-05-30 RX ADMIN — ENOXAPARIN SODIUM 40 MG: 40 INJECTION SUBCUTANEOUS at 08:29

## 2024-05-30 ASSESSMENT — ACTIVITIES OF DAILY LIVING (ADL)
ADLS_ACUITY_SCORE: 18

## 2024-05-30 NOTE — PLAN OF CARE
" Goal Outcome Evaluation:    Plan of Care Reviewed With: patient    Overall Patient Progress: no changeOverall Patient Progress: no change    Outcome Evaluation: Patient was able to rest and sleep throughout the night. VS WNL and reports no cxs/bleeding/LOF overnight. Will begin lovenox in AM and continue with PO abx. Continue BID monitoring for 1hr NSTs. Report to be given to oncoming RN.      Problem: Adult Inpatient Plan of Care  Goal: Plan of Care Review  Description: The Plan of Care Review/Shift note should be completed every shift.  The Outcome Evaluation is a brief statement about your assessment that the patient is improving, declining, or no change.  This information will be displayed automatically on your shift  note.  Outcome: Progressing  Flowsheets (Taken 5/30/2024 0506)  Outcome Evaluation: Patient was able to rest and sleep throughout the night. VS WNL and reports no cxs/bleeding/LOF overnight. Will begin lovenox in AM and continue with PO abx. Continue BID monitoring for 1hr NSTs. Report to be given to oncoming RN.  Plan of Care Reviewed With: patient  Overall Patient Progress: no change  Goal: Patient-Specific Goal (Individualized)  Description: You can add care plan individualizations to a care plan. Examples of Individualization might be:  \"Parent requests to be called daily at 9am for status\", \"I have a hard time hearing out of my right ear\", or \"Do not touch me to wake me up as it startles  me\".  Outcome: Progressing  Goal: Absence of Hospital-Acquired Illness or Injury  Outcome: Progressing  Intervention: Prevent Skin Injury  Recent Flowsheet Documentation  Taken 5/30/2024 0115 by Amanda Hendricks, RN  Body Position: position changed independently  Goal: Optimal Comfort and Wellbeing  Outcome: Progressing  Intervention: Provide Person-Centered Care  Recent Flowsheet Documentation  Taken 5/30/2024 0115 by Amanda Hendricks, RN  Trust Relationship/Rapport:   care explained   choices provided   " emotional support provided   empathic listening provided   questions answered   questions encouraged   reassurance provided   thoughts/feelings acknowledged  Goal: Readiness for Transition of Care  Outcome: Progressing     Problem:  Labor  Goal: Delayed  Delivery  Outcome: Progressing  Intervention: Monitor and Manage  Labor  Recent Flowsheet Documentation  Taken 2024 011 by Amanda Hendricks, RN  Body Position: position changed independently

## 2024-05-30 NOTE — PLAN OF CARE
"BID monitoring, category 1 with no contractions. VSS. Abx given per MAR. No contractions, bleeding, or abdominal pain. Atarax given per MAR to promote sleep. Cares transferred to Amanda CACERES at 2300.    Problem: Adult Inpatient Plan of Care  Goal: Plan of Care Review  Description: The Plan of Care Review/Shift note should be completed every shift.  The Outcome Evaluation is a brief statement about your assessment that the patient is improving, declining, or no change.  This information will be displayed automatically on your shift  note.  Outcome: Progressing  Goal: Patient-Specific Goal (Individualized)  Description: You can add care plan individualizations to a care plan. Examples of Individualization might be:  \"Parent requests to be called daily at 9am for status\", \"I have a hard time hearing out of my right ear\", or \"Do not touch me to wake me up as it startles  me\".  Outcome: Progressing  Goal: Absence of Hospital-Acquired Illness or Injury  Outcome: Progressing  Intervention: Prevent Skin Injury  Recent Flowsheet Documentation  Taken 2024 by Shweta Styles RN  Body Position: position changed independently  Goal: Optimal Comfort and Wellbeing  Outcome: Progressing  Intervention: Provide Person-Centered Care  Recent Flowsheet Documentation  Taken 2024 by Shweta Styles RN  Trust Relationship/Rapport:   care explained   choices provided   questions answered   questions encouraged   emotional support provided  Goal: Readiness for Transition of Care  Outcome: Progressing     Problem:  Labor  Goal: Delayed  Delivery  Outcome: Progressing  Intervention: Monitor and Manage  Labor  Recent Flowsheet Documentation  Taken 2024 by Shweta Styles RN  Body Position: position changed independently   Goal Outcome Evaluation:                        "

## 2024-05-30 NOTE — PLAN OF CARE
Report given to Ro CACERES.    Problem: Adult Inpatient Plan of Care  Goal: Plan of Care Review    Outcome: Progressing  Flowsheets (Taken 2024 1111)  Outcome Evaluation: stable  Plan of Care Reviewed With: patient  Overall Patient Progress: improving  Goal: Patient-Specific Goal (Individualized)    Outcome: Progressing  Goal: Absence of Hospital-Acquired Illness or Injury  Outcome: Progressing  Intervention: Prevent Skin Injury  Recent Flowsheet Documentation  Taken 2024 by Marisel Champion RN  Body Position: position changed independently  Intervention: Prevent and Manage VTE (Venous Thromboembolism) Risk  Recent Flowsheet Documentation  Taken 2024 08 by Marisel Champion RN  VTE Prevention/Management: SCDs (sequential compression devices) off  Goal: Optimal Comfort and Wellbeing  Outcome: Progressing  Intervention: Provide Person-Centered Care  Recent Flowsheet Documentation  Taken 2024 by Marisel Champion RN  Trust Relationship/Rapport:   care explained   questions answered   questions encouraged  Goal: Readiness for Transition of Care  Outcome: Progressing     Problem:  Labor  Goal: Delayed  Delivery  Outcome: Progressing  Intervention: Monitor and Manage  Labor  Recent Flowsheet Documentation  Taken 2024 by Marisel Champion RN  Body Position: position changed independently   Goal Outcome Evaluation:      Plan of Care Reviewed With: patient    Overall Patient Progress: improvingOverall Patient Progress: improving    Outcome Evaluation: stable

## 2024-05-30 NOTE — PROVIDER NOTIFICATION
05/30/24 0631   Provider Notification   Provider Name/Title Dr. Sepulveda   Method of Notification Phone   Request Evaluate - Remote   Notification Reason Status Update     MD called for update on patient -- she has been resting and sleeping comfortably for most of the night. Vital signs WNL. Denies cxs/bleeding/LOF. Continue PO abx and BID NSTs. Provider to put in order for lovenox this AM.
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
## 2024-05-31 LAB
ALBUMIN SERPL BCG-MCNC: 3.2 G/DL (ref 3.5–5.2)
ALP SERPL-CCNC: 84 U/L (ref 40–150)
ALT SERPL W P-5'-P-CCNC: 71 U/L (ref 0–50)
ANION GAP SERPL CALCULATED.3IONS-SCNC: 13 MMOL/L (ref 7–15)
AST SERPL W P-5'-P-CCNC: 34 U/L (ref 0–45)
BILIRUB SERPL-MCNC: 0.3 MG/DL
BUN SERPL-MCNC: 7.7 MG/DL (ref 6–20)
CALCIUM SERPL-MCNC: 8.5 MG/DL (ref 8.6–10)
CHLORIDE SERPL-SCNC: 105 MMOL/L (ref 98–107)
CREAT SERPL-MCNC: 0.56 MG/DL (ref 0.51–0.95)
DEPRECATED HCO3 PLAS-SCNC: 20 MMOL/L (ref 22–29)
EGFRCR SERPLBLD CKD-EPI 2021: >90 ML/MIN/1.73M2
ERYTHROCYTE [DISTWIDTH] IN BLOOD BY AUTOMATED COUNT: 12.7 % (ref 10–15)
GLUCOSE SERPL-MCNC: 85 MG/DL (ref 70–99)
HCT VFR BLD AUTO: 38.8 % (ref 35–47)
HGB BLD-MCNC: 13.4 G/DL (ref 11.7–15.7)
MCH RBC QN AUTO: 30.4 PG (ref 26.5–33)
MCHC RBC AUTO-ENTMCNC: 34.5 G/DL (ref 31.5–36.5)
MCV RBC AUTO: 88 FL (ref 78–100)
PLATELET # BLD AUTO: 269 10E3/UL (ref 150–450)
POTASSIUM SERPL-SCNC: 4.5 MMOL/L (ref 3.4–5.3)
PROT SERPL-MCNC: 6.3 G/DL (ref 6.4–8.3)
RBC # BLD AUTO: 4.41 10E6/UL (ref 3.8–5.2)
SODIUM SERPL-SCNC: 138 MMOL/L (ref 135–145)
WBC # BLD AUTO: 12.6 10E3/UL (ref 4–11)

## 2024-05-31 PROCEDURE — 36415 COLL VENOUS BLD VENIPUNCTURE: CPT | Performed by: STUDENT IN AN ORGANIZED HEALTH CARE EDUCATION/TRAINING PROGRAM

## 2024-05-31 PROCEDURE — 250N000011 HC RX IP 250 OP 636: Performed by: OBSTETRICS & GYNECOLOGY

## 2024-05-31 PROCEDURE — 250N000013 HC RX MED GY IP 250 OP 250 PS 637: Performed by: OBSTETRICS & GYNECOLOGY

## 2024-05-31 PROCEDURE — 80053 COMPREHEN METABOLIC PANEL: CPT | Performed by: STUDENT IN AN ORGANIZED HEALTH CARE EDUCATION/TRAINING PROGRAM

## 2024-05-31 PROCEDURE — 85027 COMPLETE CBC AUTOMATED: CPT | Performed by: STUDENT IN AN ORGANIZED HEALTH CARE EDUCATION/TRAINING PROGRAM

## 2024-05-31 PROCEDURE — 120N000001 HC R&B MED SURG/OB

## 2024-05-31 RX ADMIN — AMOXICILLIN 250 MG: 250 CAPSULE ORAL at 08:17

## 2024-05-31 RX ADMIN — ENOXAPARIN SODIUM 40 MG: 40 INJECTION SUBCUTANEOUS at 08:36

## 2024-05-31 RX ADMIN — AMOXICILLIN 250 MG: 250 CAPSULE ORAL at 21:29

## 2024-05-31 RX ADMIN — PRENATAL VITAMINS-IRON FUMARATE 27 MG IRON-FOLIC ACID 0.8 MG TABLET 1 TABLET: at 08:17

## 2024-05-31 RX ADMIN — POLYETHYLENE GLYCOL 3350 17 G: 17 POWDER, FOR SOLUTION ORAL at 08:17

## 2024-05-31 RX ADMIN — AMOXICILLIN 250 MG: 250 CAPSULE ORAL at 16:06

## 2024-05-31 ASSESSMENT — ACTIVITIES OF DAILY LIVING (ADL)
ADLS_ACUITY_SCORE: 18
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
## 2024-05-31 NOTE — PLAN OF CARE
Goal Outcome Evaluation:      Plan of Care Reviewed With: patient, spouse, family    Overall Patient Progress: improvingOverall Patient Progress: improving    Outcome Evaluation: Vitals stable, afebrile. Baby active. Patient reports she is continuing to leak clear fluid. No vaginal bleeding. IV saline locked. Denies painful contractions but states she feels occasional mild tightness in her lower uterus.      Problem: Adult Inpatient Plan of Care  Goal: Plan of Care Review  Outcome: Progressing  Flowsheets (Taken 5/30/2024 1830)  Outcome Evaluation: Vitals stable, afebrile. Baby active. Patient reports she is continuing to leak clear fluid. No vaginal bleeding. IV saline locked. Denies painful contractions but states she feels occasional mild tightness in her lower uterus.  Plan of Care Reviewed With:   patient   spouse   family  Overall Patient Progress: improving  Goal: Patient-Specific Goal (Individualized)  Outcome: Progressing  Goal: Absence of Hospital-Acquired Illness or Injury  Outcome: Progressing  Intervention: Prevent Skin Injury  Recent Flowsheet Documentation  Taken 5/30/2024 1113 by Jeanna Taylor RN  Body Position: position changed independently  Intervention: Prevent and Manage VTE (Venous Thromboembolism) Risk  Recent Flowsheet Documentation  Taken 5/30/2024 1113 by Jeanna Taylor RN  VTE Prevention/Management: (Per patient request, education provided) SCDs (sequential compression devices) off  Goal: Optimal Comfort and Wellbeing  Outcome: Progressing  Intervention: Monitor Pain and Promote Comfort  Recent Flowsheet Documentation  Taken 5/30/2024 1113 by Jeanna Taylor RN  Pain Management Interventions: declines  Intervention: Provide Person-Centered Care  Recent Flowsheet Documentation  Taken 5/30/2024 1113 by Jeanna Taylor RN  Trust Relationship/Rapport:   care explained   choices provided   emotional support provided   empathic listening provided   questions answered    questions encouraged   reassurance provided   thoughts/feelings acknowledged  Goal: Readiness for Transition of Care  Outcome: Progressing     Problem:  Labor  Goal: Delayed  Delivery  Outcome: Progressing  Intervention: Monitor and Manage  Labor  Recent Flowsheet Documentation  Taken 2024 1113 by Jeanna Taylor, RN  Body Position: position changed independently

## 2024-05-31 NOTE — PLAN OF CARE
Problem: Adult Inpatient Plan of Care  Goal: Plan of Care Review  Overall Patient Progress: improving  Pt stable this shift. VSS, no janeth or pain. Continues to leak clear fluid. Baby active. NST reactive. Continue with POC  Outcome: Progressing  Flowsheets (Taken 2024 1426)  Plan of Care Reviewed With: patient  Overall Patient Progress: improving    Outcome: Progressing  Goal: Absence of Hospital-Acquired Illness or Injury  Outcome: Progressing  Intervention: Prevent Skin Injury  Recent Flowsheet Documentation  Taken 2024 08 by Mikayla Linn RN  Body Position: position changed independently  Goal: Optimal Comfort and Wellbeing  Outcome: Progressing  Goal: Readiness for Transition of Care  Outcome: Progressing     Problem:  Labor  Goal: Delayed  Delivery  Outcome: Progressing  Intervention: Monitor and Manage  Labor  Recent Flowsheet Documentation  Taken 2024 08 by Mikayla Linn, RN  Body Position: position changed independently   Goal Outcome Evaluation:      Plan of Care Reviewed With: patient    Overall Patient Progress: improving

## 2024-06-01 PROCEDURE — 250N000011 HC RX IP 250 OP 636: Performed by: OBSTETRICS & GYNECOLOGY

## 2024-06-01 PROCEDURE — 120N000001 HC R&B MED SURG/OB

## 2024-06-01 PROCEDURE — 250N000013 HC RX MED GY IP 250 OP 250 PS 637: Performed by: OBSTETRICS & GYNECOLOGY

## 2024-06-01 PROCEDURE — 258N000003 HC RX IP 258 OP 636: Performed by: OBSTETRICS & GYNECOLOGY

## 2024-06-01 RX ADMIN — AMOXICILLIN 250 MG: 250 CAPSULE ORAL at 21:34

## 2024-06-01 RX ADMIN — SODIUM CHLORIDE, POTASSIUM CHLORIDE, SODIUM LACTATE AND CALCIUM CHLORIDE 500 ML: 600; 310; 30; 20 INJECTION, SOLUTION INTRAVENOUS at 12:41

## 2024-06-01 RX ADMIN — PRENATAL VITAMINS-IRON FUMARATE 27 MG IRON-FOLIC ACID 0.8 MG TABLET 1 TABLET: at 08:46

## 2024-06-01 RX ADMIN — ENOXAPARIN SODIUM 40 MG: 40 INJECTION SUBCUTANEOUS at 08:50

## 2024-06-01 RX ADMIN — AMOXICILLIN 250 MG: 250 CAPSULE ORAL at 08:46

## 2024-06-01 RX ADMIN — AMOXICILLIN 250 MG: 250 CAPSULE ORAL at 15:57

## 2024-06-01 ASSESSMENT — ACTIVITIES OF DAILY LIVING (ADL)
ADLS_ACUITY_SCORE: 18
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
## 2024-06-01 NOTE — PLAN OF CARE
Goal Outcome Evaluation:      Plan of Care Reviewed With: patient    Overall Patient Progress: improvingOverall Patient Progress: improving    Outcome Evaluation: Vitals stable, afebrile. Following fluid bolus patient reports occasional contractions but not meeting criteria to replace fetal monitors. Patient reports continuing to leak clear fluid. She has been in good spirits today and is coping well with hospitalization.      Problem: Adult Inpatient Plan of Care  Goal: Plan of Care Review  Outcome: Progressing  Flowsheets (Taken 6/1/2024 1838)  Outcome Evaluation: Vitals stable, afebrile. Following fluid bolus patient reports occasional contractions but not meeting criteria to replace fetal monitors. Patient reports continuing to leak clear fluid. She has been in good spirits today and is coping well with hospitalization.  Plan of Care Reviewed With: patient  Overall Patient Progress: improving  Goal: Patient-Specific Goal (Individualized)  Outcome: Progressing  Goal: Absence of Hospital-Acquired Illness or Injury  Outcome: Progressing  Intervention: Prevent Skin Injury  Recent Flowsheet Documentation  Taken 6/1/2024 0750 by Jeanna Taylor RN  Body Position: position changed independently  Intervention: Prevent and Manage VTE (Venous Thromboembolism) Risk  Recent Flowsheet Documentation  Taken 6/1/2024 0750 by Jeanna Taylor RN  VTE Prevention/Management: SCDs (sequential compression devices) off  Goal: Optimal Comfort and Wellbeing  Outcome: Progressing  Intervention: Monitor Pain and Promote Comfort  Recent Flowsheet Documentation  Taken 6/1/2024 1355 by Jeanna Taylor RN  Pain Management Interventions: declines  Taken 6/1/2024 1155 by Jeanna Taylor RN  Pain Management Interventions: declines  Intervention: Provide Person-Centered Care  Recent Flowsheet Documentation  Taken 6/1/2024 0750 by Jeanna Taylor RN  Trust Relationship/Rapport:   care explained   choices provided    emotional support provided   questions answered   empathic listening provided   questions encouraged   reassurance provided   thoughts/feelings acknowledged  Goal: Readiness for Transition of Care  Outcome: Progressing     Problem:  Labor  Goal: Delayed  Delivery  Outcome: Progressing  Intervention: Monitor and Manage  Labor  Recent Flowsheet Documentation  Taken 2024 8050 by Jeanna Taylor, RN  Body Position: position changed independently

## 2024-06-01 NOTE — PLAN OF CARE
"VSS, pt afebrile overnight. Pt resting comfortably overnight. Pt reports she is continuing to leak clear, thin fluid on pads that now has an odor - pads examined by RN. Pt denies thick, purulent, bloody fluid. Pt educated to notify care team if any changes to leaking fluid. Pt denies ctx.    Problem: Adult Inpatient Plan of Care  Goal: Plan of Care Review  Description: The Plan of Care Review/Shift note should be completed every shift.  The Outcome Evaluation is a brief statement about your assessment that the patient is improving, declining, or no change.  This information will be displayed automatically on your shift  note.  Outcome: Progressing  Goal: Patient-Specific Goal (Individualized)  Description: You can add care plan individualizations to a care plan. Examples of Individualization might be:  \"Parent requests to be called daily at 9am for status\", \"I have a hard time hearing out of my right ear\", or \"Do not touch me to wake me up as it startles  me\".  Outcome: Progressing  Goal: Absence of Hospital-Acquired Illness or Injury  Outcome: Progressing  Goal: Optimal Comfort and Wellbeing  Outcome: Progressing  Intervention: Monitor Pain and Promote Comfort  Recent Flowsheet Documentation  Taken 6/1/2024 0348 by Shawna Trevizo, RN  Pain Management Interventions: declines  Taken 5/31/2024 1947 by Shawna Trevizo, RN  Pain Management Interventions: declines  Intervention: Provide Person-Centered Care  Recent Flowsheet Documentation  Taken 5/31/2024 2345 by Shawna Trevizo, RN  Trust Relationship/Rapport:   care explained   choices provided   emotional support provided   empathic listening provided   questions answered   questions encouraged   reassurance provided   thoughts/feelings acknowledged  Taken 5/31/2024 1920 by Shawna Trevizo, RN  Trust Relationship/Rapport:   care explained   choices provided   emotional support provided   empathic listening provided   questions answered   questions " encouraged   reassurance provided   thoughts/feelings acknowledged  Goal: Readiness for Transition of Care  Outcome: Progressing     Problem:  Labor  Goal: Delayed  Delivery  Outcome: Progressing

## 2024-06-01 NOTE — PROVIDER NOTIFICATION
06/01/24 1227   Provider Notification   Provider Name/Title Dr. Sepulveda   Method of Notification Phone   Request Evaluate - Remote   Notification Reason Uterine Activity     MD updated. Patient is reporting constant uterine cramping rated 4/10 and radiating to her back. Fetal monitors applied - she is janeth every 2-2.5 minutes, most lasting 30-40 seconds. Contractions palpate moderate. FHR category 1. TORB for IV fluid bolus of 500 mL LR given over 30 minutes. Continue to monitor for one hour and then update MD.

## 2024-06-01 NOTE — PROVIDER NOTIFICATION
06/01/24 1406   Provider Notification   Provider Name/Title Dr. Sepulveda   Method of Notification Phone   Request Evaluate - Remote   Notification Reason Status Update;Uterine Activity     MD updated following fluid bolus. Uterine irritability has improved, she is no longer reporting constant lower uterine cramping/tightness. Patient marking the contractions she is able to feel, she has marked three times in the last hour. She states these contractions are stronger than what she was feeling previously rating them 5/10 but still coping and talking through her contractions. Patient reports leaking more clear fluid today than she has previously. MD states RN may remove fetal monitors at this time. Instruct patient to notify nursing if her contractions become more painful or for contractions less than 15 minutes apart, would reapply fetal and uterine monitors at that time. Update MD with changes in pain or contraction frequency.

## 2024-06-02 LAB
ABO/RH(D): NORMAL
ANTIBODY SCREEN: NEGATIVE
SPECIMEN EXPIRATION DATE: NORMAL

## 2024-06-02 PROCEDURE — 250N000013 HC RX MED GY IP 250 OP 250 PS 637: Performed by: OBSTETRICS & GYNECOLOGY

## 2024-06-02 PROCEDURE — 120N000001 HC R&B MED SURG/OB

## 2024-06-02 PROCEDURE — 36415 COLL VENOUS BLD VENIPUNCTURE: CPT | Performed by: OBSTETRICS & GYNECOLOGY

## 2024-06-02 PROCEDURE — 250N000011 HC RX IP 250 OP 636: Performed by: OBSTETRICS & GYNECOLOGY

## 2024-06-02 PROCEDURE — 86900 BLOOD TYPING SEROLOGIC ABO: CPT | Performed by: OBSTETRICS & GYNECOLOGY

## 2024-06-02 RX ADMIN — AMOXICILLIN 250 MG: 250 CAPSULE ORAL at 16:03

## 2024-06-02 RX ADMIN — PRENATAL VITAMINS-IRON FUMARATE 27 MG IRON-FOLIC ACID 0.8 MG TABLET 1 TABLET: at 08:41

## 2024-06-02 RX ADMIN — ENOXAPARIN SODIUM 40 MG: 40 INJECTION SUBCUTANEOUS at 08:23

## 2024-06-02 RX ADMIN — AMOXICILLIN 250 MG: 250 CAPSULE ORAL at 22:10

## 2024-06-02 RX ADMIN — AMOXICILLIN 250 MG: 250 CAPSULE ORAL at 08:41

## 2024-06-02 ASSESSMENT — ACTIVITIES OF DAILY LIVING (ADL)
ADLS_ACUITY_SCORE: 18

## 2024-06-02 NOTE — PROVIDER NOTIFICATION
06/02/24 1312   Provider Notification   Provider Name/Title Dr. Ríos   Method of Notification In Department   Request Evaluate - Remote   Notification Reason Other (Comment)  (Fluid color)     MD updated in department. Patient called RN to room to look at a pad, concerned for change in fluid color as there was a scant amount of green fluid. Will continue to observe pads.

## 2024-06-02 NOTE — PLAN OF CARE
Goal Outcome Evaluation:      Plan of Care Reviewed With: patient    Overall Patient Progress: improvingOverall Patient Progress: improving    Outcome Evaluation: Vitals stable, afebrile. Patient reports rare mild contractions stating she has a contraction approximately every 2-3 hours. NST this moring reactive. Monitoring fluid color for potential meconium staining. Patient in good spirits and coping well with hospital stay.      Problem: Adult Inpatient Plan of Care  Goal: Plan of Care Review  Outcome: Progressing  Flowsheets (Taken 2024 1841)  Outcome Evaluation: Vitals stable, afebrile. Patient reports rare mild contractions stating she has a contraction approximately every 2-3 hours. NST this moring reactive. Monitoring fluid color for potential meconium staining. Patient in good spirits and coping well with hospital stay.  Plan of Care Reviewed With: patient  Overall Patient Progress: improving  Goal: Patient-Specific Goal (Individualized)  Outcome: Progressing  Goal: Absence of Hospital-Acquired Illness or Injury  Outcome: Progressing  Intervention: Prevent Skin Injury  Recent Flowsheet Documentation  Taken 2024 by Jeanna Taylor RN  Body Position: position changed independently  Intervention: Prevent and Manage VTE (Venous Thromboembolism) Risk  Recent Flowsheet Documentation  Taken 2024 by Jeanna Taylor RN  VTE Prevention/Management: SCDs (sequential compression devices) off  Goal: Optimal Comfort and Wellbeing  Outcome: Progressing  Intervention: Provide Person-Centered Care  Recent Flowsheet Documentation  Taken 2024 by Jeanna Taylor RN  Trust Relationship/Rapport:   care explained   choices provided   emotional support provided   empathic listening provided   questions answered   questions encouraged   reassurance provided   thoughts/feelings acknowledged  Goal: Readiness for Transition of Care  Outcome: Progressing     Problem:  Labor  Goal:  Delayed  Delivery  Outcome: Progressing  Intervention: Monitor and Manage  Labor  Recent Flowsheet Documentation  Taken 2024 by Jeanna Taylor, RN  Body Position: position changed independently

## 2024-06-02 NOTE — PROVIDER NOTIFICATION
06/02/24 0940   Provider Notification   Provider Name/Title Dr. Ríos   Method of Notification In Department   Request Evaluate - Remote   Notification Reason Status Update     MD updated. Patient is doing well this morning, she states she is feeling only very occasional mild cramping. Reports continuing to leak clear fluid. Her partner is at bedside, appropriate. MD reviewed FHR tracing from monitoring this morning, moderate variability, accels present, non-significant variable decel x1. Contractions x2 in 60 minute period. Baby active.
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
## 2024-06-03 LAB
ALBUMIN SERPL BCG-MCNC: 3.2 G/DL (ref 3.5–5.2)
ALP SERPL-CCNC: 88 U/L (ref 40–150)
ALT SERPL W P-5'-P-CCNC: 36 U/L (ref 0–50)
ANION GAP SERPL CALCULATED.3IONS-SCNC: 14 MMOL/L (ref 7–15)
AST SERPL W P-5'-P-CCNC: 17 U/L (ref 0–45)
BILIRUB SERPL-MCNC: 0.3 MG/DL
BUN SERPL-MCNC: 7.7 MG/DL (ref 6–20)
CALCIUM SERPL-MCNC: 8.6 MG/DL (ref 8.6–10)
CHLORIDE SERPL-SCNC: 103 MMOL/L (ref 98–107)
CREAT SERPL-MCNC: 0.52 MG/DL (ref 0.51–0.95)
DEPRECATED HCO3 PLAS-SCNC: 19 MMOL/L (ref 22–29)
EGFRCR SERPLBLD CKD-EPI 2021: >90 ML/MIN/1.73M2
ERYTHROCYTE [DISTWIDTH] IN BLOOD BY AUTOMATED COUNT: 13 % (ref 10–15)
GLUCOSE SERPL-MCNC: 79 MG/DL (ref 70–99)
HCT VFR BLD AUTO: 40.9 % (ref 35–47)
HGB BLD-MCNC: 13.4 G/DL (ref 11.7–15.7)
MCH RBC QN AUTO: 30 PG (ref 26.5–33)
MCHC RBC AUTO-ENTMCNC: 32.8 G/DL (ref 31.5–36.5)
MCV RBC AUTO: 92 FL (ref 78–100)
PLATELET # BLD AUTO: 262 10E3/UL (ref 150–450)
POTASSIUM SERPL-SCNC: 4 MMOL/L (ref 3.4–5.3)
PROT SERPL-MCNC: 6.2 G/DL (ref 6.4–8.3)
RBC # BLD AUTO: 4.47 10E6/UL (ref 3.8–5.2)
SODIUM SERPL-SCNC: 136 MMOL/L (ref 135–145)
WBC # BLD AUTO: 12.2 10E3/UL (ref 4–11)

## 2024-06-03 PROCEDURE — 85027 COMPLETE CBC AUTOMATED: CPT | Performed by: OBSTETRICS & GYNECOLOGY

## 2024-06-03 PROCEDURE — 120N000001 HC R&B MED SURG/OB

## 2024-06-03 PROCEDURE — 80053 COMPREHEN METABOLIC PANEL: CPT | Performed by: OBSTETRICS & GYNECOLOGY

## 2024-06-03 PROCEDURE — 250N000011 HC RX IP 250 OP 636: Performed by: OBSTETRICS & GYNECOLOGY

## 2024-06-03 PROCEDURE — 250N000013 HC RX MED GY IP 250 OP 250 PS 637: Performed by: OBSTETRICS & GYNECOLOGY

## 2024-06-03 PROCEDURE — 36415 COLL VENOUS BLD VENIPUNCTURE: CPT | Performed by: OBSTETRICS & GYNECOLOGY

## 2024-06-03 RX ADMIN — PRENATAL VITAMINS-IRON FUMARATE 27 MG IRON-FOLIC ACID 0.8 MG TABLET 1 TABLET: at 08:41

## 2024-06-03 RX ADMIN — ENOXAPARIN SODIUM 40 MG: 40 INJECTION SUBCUTANEOUS at 08:41

## 2024-06-03 ASSESSMENT — ACTIVITIES OF DAILY LIVING (ADL)
ADLS_ACUITY_SCORE: 18
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
## 2024-06-03 NOTE — PLAN OF CARE
"Goal Outcome Evaluation:  Plan of Care Reviewed With: patient  Overall Patient Progress: no change  Patient stable overnight, NST at 2030 was reactive, patient is afebrile. A few mild cramps reported by patient, while sitting for her NST. Patient continues to report seeing a very small amount of brown/green discharge on her pad. Plan to continue monitoring.     Problem: Adult Inpatient Plan of Care  Goal: Plan of Care Review  Description: The Plan of Care Review/Shift note should be completed every shift.  The Outcome Evaluation is a brief statement about your assessment that the patient is improving, declining, or no change.  This information will be displayed automatically on your shift  note.  Outcome: Progressing  Flowsheets (Taken 6/3/2024 0655)  Plan of Care Reviewed With: patient  Overall Patient Progress: no change  Goal: Patient-Specific Goal (Individualized)  Description: You can add care plan individualizations to a care plan. Examples of Individualization might be:  \"Parent requests to be called daily at 9am for status\", \"I have a hard time hearing out of my right ear\", or \"Do not touch me to wake me up as it startles  me\".  Outcome: Progressing  Goal: Absence of Hospital-Acquired Illness or Injury  Outcome: Progressing  Goal: Optimal Comfort and Wellbeing  Outcome: Progressing  Intervention: Provide Person-Centered Care  Recent Flowsheet Documentation  Taken 2024 by Nataly Figueroa, RN  Trust Relationship/Rapport:   care explained   choices provided   emotional support provided   empathic listening provided   questions answered   questions encouraged   reassurance provided   thoughts/feelings acknowledged  Goal: Readiness for Transition of Care  Outcome: Progressing     Problem:  Labor  Goal: Delayed  Delivery  Outcome: Progressing     "

## 2024-06-04 ENCOUNTER — APPOINTMENT (OUTPATIENT)
Dept: ULTRASOUND IMAGING | Facility: CLINIC | Age: 34
End: 2024-06-04
Attending: OBSTETRICS & GYNECOLOGY
Payer: COMMERCIAL

## 2024-06-04 PROCEDURE — 76819 FETAL BIOPHYS PROFIL W/O NST: CPT

## 2024-06-04 PROCEDURE — 76820 UMBILICAL ARTERY ECHO: CPT | Mod: 26 | Performed by: STUDENT IN AN ORGANIZED HEALTH CARE EDUCATION/TRAINING PROGRAM

## 2024-06-04 PROCEDURE — 250N000011 HC RX IP 250 OP 636: Performed by: OBSTETRICS & GYNECOLOGY

## 2024-06-04 PROCEDURE — 76819 FETAL BIOPHYS PROFIL W/O NST: CPT | Mod: 26 | Performed by: STUDENT IN AN ORGANIZED HEALTH CARE EDUCATION/TRAINING PROGRAM

## 2024-06-04 PROCEDURE — 250N000013 HC RX MED GY IP 250 OP 250 PS 637: Performed by: OBSTETRICS & GYNECOLOGY

## 2024-06-04 PROCEDURE — 120N000001 HC R&B MED SURG/OB

## 2024-06-04 RX ADMIN — ENOXAPARIN SODIUM 40 MG: 40 INJECTION SUBCUTANEOUS at 08:43

## 2024-06-04 RX ADMIN — PRENATAL VITAMINS-IRON FUMARATE 27 MG IRON-FOLIC ACID 0.8 MG TABLET 1 TABLET: at 08:42

## 2024-06-04 ASSESSMENT — ACTIVITIES OF DAILY LIVING (ADL)
ADLS_ACUITY_SCORE: 18

## 2024-06-04 NOTE — PLAN OF CARE
"Patient stable. Reports occasional tightness in her belly but states \"it's how it has been.\" Still reporting scant amounts of clear liquid leaking & good fetal movement. Afebrile. FHR moderate with accels and x2 contractions on toco. Overall in good spirits and able to rest overnight.     Problem: Adult Inpatient Plan of Care  Goal: Plan of Care Review  Outcome: Progressing  Flowsheets (Taken 2024 0601)  Plan of Care Reviewed With: patient  Overall Patient Progress: improving  Goal: Absence of Hospital-Acquired Illness or Injury  Outcome: Progressing  Intervention: Prevent Skin Injury  Recent Flowsheet Documentation  Taken 6/3/2024 2045 by Christine Reynaga RN  Body Position: position changed independently  Goal: Optimal Comfort and Wellbeing  Outcome: Progressing  Intervention: Monitor Pain and Promote Comfort  Recent Flowsheet Documentation  Taken 2024 0500 by Christine Reynaga RN  Pain Management Interventions: rest  Intervention: Provide Person-Centered Care  Recent Flowsheet Documentation  Taken 6/3/2024 2045 by Christine Reynaga RN  Trust Relationship/Rapport:   care explained   choices provided   questions answered   questions encouraged   reassurance provided   thoughts/feelings acknowledged     Problem:  Labor  Goal: Delayed  Delivery  Outcome: Progressing  Intervention: Monitor and Manage  Labor  Recent Flowsheet Documentation  Taken 6/3/2024 2045 by Christine Reynaga RN  Body Position: position changed independently   Goal Outcome Evaluation:      Plan of Care Reviewed With: patient    Overall Patient Progress: improvingOverall Patient Progress: improving           "

## 2024-06-04 NOTE — PLAN OF CARE
Goal Outcome Evaluation:           Overall Patient Progress: no changeOverall Patient Progress: no change           Problem: Adult Inpatient Plan of Care  Goal: Plan of Care Review  Description: The Plan of Care Review/Shift note should be completed every shift.  The Outcome Evaluation is a brief statement about your assessment that the patient is improving, declining, or no change.  This information will be displayed automatically on your shift  note.  Flowsheets (Taken 6/4/2024 1562)  Overall Patient Progress: no change   Patient in good spirits and is optimistic regarding hospital stay, 1 hour EFM/EUM reactive tracing rare mild contraction, MFM will arrive 1145 for BPP
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
## 2024-06-04 NOTE — CARE PLAN
MFM here for bedside BPP,  patient's  here to visit  Dealing with [employer] insurance and  having knee surgery this Thursday

## 2024-06-04 NOTE — PROVIDER NOTIFICATION
"   06/04/24 6200   Provider Notification   Provider Name/Title Dr. Sepulveda   Method of Notification Phone   Request Evaluate - Remote   Notification Reason Other (Comment)     Patient called RN into the room stating that her L leg has felt numb from the knee down for the past hour. Patient states it feels \"not tingly or painful, but numb\". RN assessment WNL. No redness or swelling visualized and both lower extremities equal in size with palpable pedal pulses.     RN called to inform MD. Per MD, have the patient sit on the edge of the bed and dangle for a while to see if there is any improvement. May need to consult hospitalist if no change occurs. Will follow up with MD if needed.   "

## 2024-06-04 NOTE — PROGRESS NOTES
"Please see \"Imaging\" tab under \"Chart Review\" for details of today's visit.    Tere Mcgowan    "
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
## 2024-06-05 ENCOUNTER — ANESTHESIA EVENT (OUTPATIENT)
Dept: OBGYN | Facility: CLINIC | Age: 34
End: 2024-06-05
Payer: COMMERCIAL

## 2024-06-05 ENCOUNTER — ANESTHESIA (OUTPATIENT)
Dept: OBGYN | Facility: CLINIC | Age: 34
End: 2024-06-05
Payer: COMMERCIAL

## 2024-06-05 LAB
ABO/RH(D): NORMAL
ALBUMIN SERPL BCG-MCNC: 3.3 G/DL (ref 3.5–5.2)
ALP SERPL-CCNC: 94 U/L (ref 40–150)
ALT SERPL W P-5'-P-CCNC: 27 U/L (ref 0–50)
ANION GAP SERPL CALCULATED.3IONS-SCNC: 16 MMOL/L (ref 7–15)
ANTIBODY SCREEN: NEGATIVE
AST SERPL W P-5'-P-CCNC: 19 U/L (ref 0–45)
BILIRUB SERPL-MCNC: 0.4 MG/DL
BUN SERPL-MCNC: 6.1 MG/DL (ref 6–20)
CALCIUM SERPL-MCNC: 7.9 MG/DL (ref 8.6–10)
CHLORIDE SERPL-SCNC: 101 MMOL/L (ref 98–107)
CREAT SERPL-MCNC: 0.53 MG/DL (ref 0.51–0.95)
DEPRECATED HCO3 PLAS-SCNC: 16 MMOL/L (ref 22–29)
EGFRCR SERPLBLD CKD-EPI 2021: >90 ML/MIN/1.73M2
ERYTHROCYTE [DISTWIDTH] IN BLOOD BY AUTOMATED COUNT: 12.6 % (ref 10–15)
GLUCOSE SERPL-MCNC: 118 MG/DL (ref 70–99)
HCT VFR BLD AUTO: 38.8 % (ref 35–47)
HGB BLD-MCNC: 13.7 G/DL (ref 11.7–15.7)
MCH RBC QN AUTO: 30.2 PG (ref 26.5–33)
MCHC RBC AUTO-ENTMCNC: 35.3 G/DL (ref 31.5–36.5)
MCV RBC AUTO: 86 FL (ref 78–100)
PLATELET # BLD AUTO: 278 10E3/UL (ref 150–450)
POTASSIUM SERPL-SCNC: 4.6 MMOL/L (ref 3.4–5.3)
PROT SERPL-MCNC: 6.5 G/DL (ref 6.4–8.3)
RBC # BLD AUTO: 4.54 10E6/UL (ref 3.8–5.2)
SODIUM SERPL-SCNC: 133 MMOL/L (ref 135–145)
SPECIMEN EXPIRATION DATE: NORMAL
WBC # BLD AUTO: 20.1 10E3/UL (ref 4–11)

## 2024-06-05 PROCEDURE — 80053 COMPREHEN METABOLIC PANEL: CPT | Performed by: STUDENT IN AN ORGANIZED HEALTH CARE EDUCATION/TRAINING PROGRAM

## 2024-06-05 PROCEDURE — 250N000013 HC RX MED GY IP 250 OP 250 PS 637: Performed by: STUDENT IN AN ORGANIZED HEALTH CARE EDUCATION/TRAINING PROGRAM

## 2024-06-05 PROCEDURE — 258N000003 HC RX IP 258 OP 636: Performed by: STUDENT IN AN ORGANIZED HEALTH CARE EDUCATION/TRAINING PROGRAM

## 2024-06-05 PROCEDURE — 00HU33Z INSERTION OF INFUSION DEVICE INTO SPINAL CANAL, PERCUTANEOUS APPROACH: ICD-10-PCS | Performed by: ANESTHESIOLOGY

## 2024-06-05 PROCEDURE — 250N000009 HC RX 250: Performed by: STUDENT IN AN ORGANIZED HEALTH CARE EDUCATION/TRAINING PROGRAM

## 2024-06-05 PROCEDURE — 250N000011 HC RX IP 250 OP 636: Performed by: ANESTHESIOLOGY

## 2024-06-05 PROCEDURE — 88307 TISSUE EXAM BY PATHOLOGIST: CPT | Mod: TC | Performed by: STUDENT IN AN ORGANIZED HEALTH CARE EDUCATION/TRAINING PROGRAM

## 2024-06-05 PROCEDURE — 0UQGXZZ REPAIR VAGINA, EXTERNAL APPROACH: ICD-10-PCS | Performed by: STUDENT IN AN ORGANIZED HEALTH CARE EDUCATION/TRAINING PROGRAM

## 2024-06-05 PROCEDURE — 36415 COLL VENOUS BLD VENIPUNCTURE: CPT | Performed by: OBSTETRICS & GYNECOLOGY

## 2024-06-05 PROCEDURE — 722N000001 HC LABOR CARE VAGINAL DELIVERY SINGLE

## 2024-06-05 PROCEDURE — 88307 TISSUE EXAM BY PATHOLOGIST: CPT | Mod: 26 | Performed by: PATHOLOGY

## 2024-06-05 PROCEDURE — 120N000001 HC R&B MED SURG/OB

## 2024-06-05 PROCEDURE — 86900 BLOOD TYPING SEROLOGIC ABO: CPT | Performed by: OBSTETRICS & GYNECOLOGY

## 2024-06-05 PROCEDURE — 36416 COLLJ CAPILLARY BLOOD SPEC: CPT | Performed by: STUDENT IN AN ORGANIZED HEALTH CARE EDUCATION/TRAINING PROGRAM

## 2024-06-05 PROCEDURE — 370N000003 HC ANESTHESIA WARD SERVICE: Performed by: ANESTHESIOLOGY

## 2024-06-05 PROCEDURE — 250N000011 HC RX IP 250 OP 636: Performed by: STUDENT IN AN ORGANIZED HEALTH CARE EDUCATION/TRAINING PROGRAM

## 2024-06-05 PROCEDURE — 3E0R3BZ INTRODUCTION OF ANESTHETIC AGENT INTO SPINAL CANAL, PERCUTANEOUS APPROACH: ICD-10-PCS | Performed by: ANESTHESIOLOGY

## 2024-06-05 PROCEDURE — 85048 AUTOMATED LEUKOCYTE COUNT: CPT | Performed by: STUDENT IN AN ORGANIZED HEALTH CARE EDUCATION/TRAINING PROGRAM

## 2024-06-05 RX ORDER — OXYTOCIN/0.9 % SODIUM CHLORIDE 30/500 ML
340 PLASTIC BAG, INJECTION (ML) INTRAVENOUS CONTINUOUS PRN
Status: DISCONTINUED | OUTPATIENT
Start: 2024-06-05 | End: 2024-06-05 | Stop reason: HOSPADM

## 2024-06-05 RX ORDER — LOPERAMIDE HCL 2 MG
2 CAPSULE ORAL
Status: DISCONTINUED | OUTPATIENT
Start: 2024-06-05 | End: 2024-06-05 | Stop reason: HOSPADM

## 2024-06-05 RX ORDER — FENTANYL CITRATE 50 UG/ML
100 INJECTION, SOLUTION INTRAMUSCULAR; INTRAVENOUS
Status: DISCONTINUED | OUTPATIENT
Start: 2024-06-05 | End: 2024-06-05 | Stop reason: HOSPADM

## 2024-06-05 RX ORDER — OXYCODONE HYDROCHLORIDE 5 MG/1
5 TABLET ORAL EVERY 4 HOURS PRN
Status: DISCONTINUED | OUTPATIENT
Start: 2024-06-05 | End: 2024-06-08 | Stop reason: HOSPADM

## 2024-06-05 RX ORDER — OXYTOCIN 10 [USP'U]/ML
10 INJECTION, SOLUTION INTRAMUSCULAR; INTRAVENOUS
Status: DISCONTINUED | OUTPATIENT
Start: 2024-06-05 | End: 2024-06-08 | Stop reason: HOSPADM

## 2024-06-05 RX ORDER — PROCHLORPERAZINE 25 MG
25 SUPPOSITORY, RECTAL RECTAL EVERY 12 HOURS PRN
Status: DISCONTINUED | OUTPATIENT
Start: 2024-06-05 | End: 2024-06-05 | Stop reason: HOSPADM

## 2024-06-05 RX ORDER — OXYTOCIN/0.9 % SODIUM CHLORIDE 30/500 ML
340 PLASTIC BAG, INJECTION (ML) INTRAVENOUS CONTINUOUS PRN
Status: DISCONTINUED | OUTPATIENT
Start: 2024-06-05 | End: 2024-06-08 | Stop reason: HOSPADM

## 2024-06-05 RX ORDER — CALCIUM GLUCONATE 94 MG/ML
1 INJECTION, SOLUTION INTRAVENOUS
Status: DISCONTINUED | OUTPATIENT
Start: 2024-06-05 | End: 2024-06-05 | Stop reason: HOSPADM

## 2024-06-05 RX ORDER — NALOXONE HYDROCHLORIDE 0.4 MG/ML
0.2 INJECTION, SOLUTION INTRAMUSCULAR; INTRAVENOUS; SUBCUTANEOUS
Status: DISCONTINUED | OUTPATIENT
Start: 2024-06-05 | End: 2024-06-05 | Stop reason: HOSPADM

## 2024-06-05 RX ORDER — NALOXONE HYDROCHLORIDE 0.4 MG/ML
0.4 INJECTION, SOLUTION INTRAMUSCULAR; INTRAVENOUS; SUBCUTANEOUS
Status: DISCONTINUED | OUTPATIENT
Start: 2024-06-05 | End: 2024-06-08 | Stop reason: HOSPADM

## 2024-06-05 RX ORDER — METOCLOPRAMIDE HYDROCHLORIDE 5 MG/ML
10 INJECTION INTRAMUSCULAR; INTRAVENOUS EVERY 6 HOURS PRN
Status: DISCONTINUED | OUTPATIENT
Start: 2024-06-05 | End: 2024-06-05 | Stop reason: HOSPADM

## 2024-06-05 RX ORDER — IBUPROFEN 800 MG/1
800 TABLET, FILM COATED ORAL EVERY 6 HOURS PRN
Status: DISCONTINUED | OUTPATIENT
Start: 2024-06-05 | End: 2024-06-05

## 2024-06-05 RX ORDER — ONDANSETRON 2 MG/ML
4 INJECTION INTRAMUSCULAR; INTRAVENOUS EVERY 6 HOURS PRN
Status: DISCONTINUED | OUTPATIENT
Start: 2024-06-05 | End: 2024-06-05 | Stop reason: HOSPADM

## 2024-06-05 RX ORDER — FENTANYL CITRATE-0.9 % NACL/PF 10 MCG/ML
100 PLASTIC BAG, INJECTION (ML) INTRAVENOUS EVERY 5 MIN PRN
Status: DISCONTINUED | OUTPATIENT
Start: 2024-06-05 | End: 2024-06-05 | Stop reason: HOSPADM

## 2024-06-05 RX ORDER — CARBOPROST TROMETHAMINE 250 UG/ML
250 INJECTION, SOLUTION INTRAMUSCULAR
Status: DISCONTINUED | OUTPATIENT
Start: 2024-06-05 | End: 2024-06-05 | Stop reason: HOSPADM

## 2024-06-05 RX ORDER — PENICILLIN G 3000000 [IU]/50ML
3 INJECTION, SOLUTION INTRAVENOUS EVERY 4 HOURS
Status: DISCONTINUED | OUTPATIENT
Start: 2024-06-05 | End: 2024-06-05 | Stop reason: HOSPADM

## 2024-06-05 RX ORDER — CITRIC ACID/SODIUM CITRATE 334-500MG
30 SOLUTION, ORAL ORAL ONCE
Status: COMPLETED | OUTPATIENT
Start: 2024-06-05 | End: 2024-06-05

## 2024-06-05 RX ORDER — IBUPROFEN 800 MG/1
800 TABLET, FILM COATED ORAL
Status: DISCONTINUED | OUTPATIENT
Start: 2024-06-05 | End: 2024-06-05

## 2024-06-05 RX ORDER — NALOXONE HYDROCHLORIDE 0.4 MG/ML
0.2 INJECTION, SOLUTION INTRAMUSCULAR; INTRAVENOUS; SUBCUTANEOUS
Status: DISCONTINUED | OUTPATIENT
Start: 2024-06-05 | End: 2024-06-08 | Stop reason: HOSPADM

## 2024-06-05 RX ORDER — PROCHLORPERAZINE MALEATE 10 MG
10 TABLET ORAL EVERY 6 HOURS PRN
Status: DISCONTINUED | OUTPATIENT
Start: 2024-06-05 | End: 2024-06-05 | Stop reason: HOSPADM

## 2024-06-05 RX ORDER — MISOPROSTOL 200 UG/1
800 TABLET ORAL
Status: DISCONTINUED | OUTPATIENT
Start: 2024-06-05 | End: 2024-06-05 | Stop reason: HOSPADM

## 2024-06-05 RX ORDER — FENTANYL/BUPIVACAINE/NS/PF 2-1250MCG
PLASTIC BAG, INJECTION (ML) INJECTION
Status: DISCONTINUED
Start: 2024-06-05 | End: 2024-06-05 | Stop reason: HOSPADM

## 2024-06-05 RX ORDER — LOPERAMIDE HCL 2 MG
2 CAPSULE ORAL
Status: DISCONTINUED | OUTPATIENT
Start: 2024-06-05 | End: 2024-06-08 | Stop reason: HOSPADM

## 2024-06-05 RX ORDER — NALOXONE HYDROCHLORIDE 0.4 MG/ML
0.4 INJECTION, SOLUTION INTRAMUSCULAR; INTRAVENOUS; SUBCUTANEOUS
Status: DISCONTINUED | OUTPATIENT
Start: 2024-06-05 | End: 2024-06-05 | Stop reason: HOSPADM

## 2024-06-05 RX ORDER — LOPERAMIDE HCL 2 MG
4 CAPSULE ORAL
Status: DISCONTINUED | OUTPATIENT
Start: 2024-06-05 | End: 2024-06-08 | Stop reason: HOSPADM

## 2024-06-05 RX ORDER — BISACODYL 10 MG
10 SUPPOSITORY, RECTAL RECTAL DAILY PRN
Status: DISCONTINUED | OUTPATIENT
Start: 2024-06-05 | End: 2024-06-08 | Stop reason: HOSPADM

## 2024-06-05 RX ORDER — METHYLERGONOVINE MALEATE 0.2 MG/ML
200 INJECTION INTRAVENOUS
Status: DISCONTINUED | OUTPATIENT
Start: 2024-06-05 | End: 2024-06-08 | Stop reason: HOSPADM

## 2024-06-05 RX ORDER — ONDANSETRON 4 MG/1
4 TABLET, ORALLY DISINTEGRATING ORAL EVERY 6 HOURS PRN
Status: DISCONTINUED | OUTPATIENT
Start: 2024-06-05 | End: 2024-06-05 | Stop reason: HOSPADM

## 2024-06-05 RX ORDER — CARBOPROST TROMETHAMINE 250 UG/ML
250 INJECTION, SOLUTION INTRAMUSCULAR
Status: DISCONTINUED | OUTPATIENT
Start: 2024-06-05 | End: 2024-06-08 | Stop reason: HOSPADM

## 2024-06-05 RX ORDER — MAGNESIUM SULFATE HEPTAHYDRATE 40 MG/ML
4 INJECTION, SOLUTION INTRAVENOUS ONCE
Status: COMPLETED | OUTPATIENT
Start: 2024-06-05 | End: 2024-06-05

## 2024-06-05 RX ORDER — TRANEXAMIC ACID 10 MG/ML
1 INJECTION, SOLUTION INTRAVENOUS EVERY 30 MIN PRN
Status: DISCONTINUED | OUTPATIENT
Start: 2024-06-05 | End: 2024-06-05 | Stop reason: HOSPADM

## 2024-06-05 RX ORDER — HYDROCORTISONE 25 MG/G
CREAM TOPICAL 3 TIMES DAILY PRN
Status: DISCONTINUED | OUTPATIENT
Start: 2024-06-05 | End: 2024-06-08 | Stop reason: HOSPADM

## 2024-06-05 RX ORDER — MISOPROSTOL 200 UG/1
400 TABLET ORAL
Status: DISCONTINUED | OUTPATIENT
Start: 2024-06-05 | End: 2024-06-08 | Stop reason: HOSPADM

## 2024-06-05 RX ORDER — LOPERAMIDE HCL 2 MG
4 CAPSULE ORAL
Status: DISCONTINUED | OUTPATIENT
Start: 2024-06-05 | End: 2024-06-05 | Stop reason: HOSPADM

## 2024-06-05 RX ORDER — ENOXAPARIN SODIUM 100 MG/ML
40 INJECTION SUBCUTANEOUS EVERY 24 HOURS
Status: DISCONTINUED | OUTPATIENT
Start: 2024-06-06 | End: 2024-06-08 | Stop reason: HOSPADM

## 2024-06-05 RX ORDER — OXYTOCIN 10 [USP'U]/ML
10 INJECTION, SOLUTION INTRAMUSCULAR; INTRAVENOUS
Status: DISCONTINUED | OUTPATIENT
Start: 2024-06-05 | End: 2024-06-05 | Stop reason: HOSPADM

## 2024-06-05 RX ORDER — MISOPROSTOL 200 UG/1
800 TABLET ORAL
Status: DISCONTINUED | OUTPATIENT
Start: 2024-06-05 | End: 2024-06-08 | Stop reason: HOSPADM

## 2024-06-05 RX ORDER — MAGNESIUM SULFATE HEPTAHYDRATE 40 MG/ML
2 INJECTION, SOLUTION INTRAVENOUS ONCE
Status: COMPLETED | OUTPATIENT
Start: 2024-06-05 | End: 2024-06-05

## 2024-06-05 RX ORDER — ACETAMINOPHEN 325 MG/1
650 TABLET ORAL EVERY 4 HOURS PRN
Status: DISCONTINUED | OUTPATIENT
Start: 2024-06-05 | End: 2024-06-08 | Stop reason: HOSPADM

## 2024-06-05 RX ORDER — TRANEXAMIC ACID 10 MG/ML
1 INJECTION, SOLUTION INTRAVENOUS EVERY 30 MIN PRN
Status: DISCONTINUED | OUTPATIENT
Start: 2024-06-05 | End: 2024-06-08 | Stop reason: HOSPADM

## 2024-06-05 RX ORDER — CITRIC ACID/SODIUM CITRATE 334-500MG
30 SOLUTION, ORAL ORAL
Status: DISCONTINUED | OUTPATIENT
Start: 2024-06-05 | End: 2024-06-05 | Stop reason: HOSPADM

## 2024-06-05 RX ORDER — MODIFIED LANOLIN
OINTMENT (GRAM) TOPICAL
Status: DISCONTINUED | OUTPATIENT
Start: 2024-06-05 | End: 2024-06-08 | Stop reason: HOSPADM

## 2024-06-05 RX ORDER — MISOPROSTOL 200 UG/1
400 TABLET ORAL
Status: DISCONTINUED | OUTPATIENT
Start: 2024-06-05 | End: 2024-06-05 | Stop reason: HOSPADM

## 2024-06-05 RX ORDER — MAGNESIUM SULFATE IN WATER 40 MG/ML
2 INJECTION, SOLUTION INTRAVENOUS CONTINUOUS
Status: DISCONTINUED | OUTPATIENT
Start: 2024-06-05 | End: 2024-06-05 | Stop reason: HOSPADM

## 2024-06-05 RX ORDER — METHYLERGONOVINE MALEATE 0.2 MG/ML
200 INJECTION INTRAVENOUS
Status: DISCONTINUED | OUTPATIENT
Start: 2024-06-05 | End: 2024-06-05 | Stop reason: HOSPADM

## 2024-06-05 RX ORDER — KETOROLAC TROMETHAMINE 30 MG/ML
30 INJECTION, SOLUTION INTRAMUSCULAR; INTRAVENOUS
Status: COMPLETED | OUTPATIENT
Start: 2024-06-05 | End: 2024-06-05

## 2024-06-05 RX ORDER — SODIUM CHLORIDE, SODIUM LACTATE, POTASSIUM CHLORIDE, CALCIUM CHLORIDE 600; 310; 30; 20 MG/100ML; MG/100ML; MG/100ML; MG/100ML
INJECTION, SOLUTION INTRAVENOUS CONTINUOUS
Status: DISCONTINUED | OUTPATIENT
Start: 2024-06-05 | End: 2024-06-05 | Stop reason: HOSPADM

## 2024-06-05 RX ORDER — BUPIVACAINE HYDROCHLORIDE 2.5 MG/ML
INJECTION, SOLUTION EPIDURAL; INFILTRATION; INTRACAUDAL PRN
Status: DISCONTINUED | OUTPATIENT
Start: 2024-06-05 | End: 2024-06-05

## 2024-06-05 RX ORDER — PENICILLIN G POTASSIUM 5000000 [IU]/1
5 INJECTION, POWDER, FOR SOLUTION INTRAMUSCULAR; INTRAVENOUS ONCE
Status: COMPLETED | OUTPATIENT
Start: 2024-06-05 | End: 2024-06-05

## 2024-06-05 RX ORDER — METOCLOPRAMIDE 10 MG/1
10 TABLET ORAL EVERY 6 HOURS PRN
Status: DISCONTINUED | OUTPATIENT
Start: 2024-06-05 | End: 2024-06-05 | Stop reason: HOSPADM

## 2024-06-05 RX ORDER — NALBUPHINE HYDROCHLORIDE 20 MG/ML
2.5-5 INJECTION, SOLUTION INTRAMUSCULAR; INTRAVENOUS; SUBCUTANEOUS EVERY 6 HOURS PRN
Status: DISCONTINUED | OUTPATIENT
Start: 2024-06-05 | End: 2024-06-08 | Stop reason: HOSPADM

## 2024-06-05 RX ORDER — OXYTOCIN/0.9 % SODIUM CHLORIDE 30/500 ML
100-340 PLASTIC BAG, INJECTION (ML) INTRAVENOUS CONTINUOUS PRN
Status: DISCONTINUED | OUTPATIENT
Start: 2024-06-05 | End: 2024-06-08 | Stop reason: HOSPADM

## 2024-06-05 RX ORDER — DOCUSATE SODIUM 100 MG/1
100 CAPSULE, LIQUID FILLED ORAL DAILY
Status: DISCONTINUED | OUTPATIENT
Start: 2024-06-05 | End: 2024-06-08 | Stop reason: HOSPADM

## 2024-06-05 RX ADMIN — PENICILLIN G POTASSIUM 5 MILLION UNITS: 5000000 POWDER, FOR SOLUTION INTRAMUSCULAR; INTRAPLEURAL; INTRATHECAL; INTRAVENOUS at 07:57

## 2024-06-05 RX ADMIN — ONDANSETRON 4 MG: 2 INJECTION INTRAMUSCULAR; INTRAVENOUS at 09:40

## 2024-06-05 RX ADMIN — ACETAMINOPHEN 650 MG: 325 TABLET, FILM COATED ORAL at 20:29

## 2024-06-05 RX ADMIN — SODIUM CHLORIDE, POTASSIUM CHLORIDE, SODIUM LACTATE AND CALCIUM CHLORIDE: 600; 310; 30; 20 INJECTION, SOLUTION INTRAVENOUS at 12:59

## 2024-06-05 RX ADMIN — Medication 340 ML/HR: at 15:18

## 2024-06-05 RX ADMIN — PENICILLIN G 3 MILLION UNITS: 3000000 INJECTION, SOLUTION INTRAVENOUS at 11:45

## 2024-06-05 RX ADMIN — ACETAMINOPHEN 650 MG: 325 TABLET, FILM COATED ORAL at 16:14

## 2024-06-05 RX ADMIN — KETOROLAC TROMETHAMINE 30 MG: 30 INJECTION, SOLUTION INTRAMUSCULAR at 15:43

## 2024-06-05 RX ADMIN — MAGNESIUM SULFATE HEPTAHYDRATE 4 G: 4 INJECTION, SOLUTION INTRAVENOUS at 08:48

## 2024-06-05 RX ADMIN — MAGNESIUM SULFATE IN WATER 2 G/HR: 40 INJECTION, SOLUTION INTRAVENOUS at 09:31

## 2024-06-05 RX ADMIN — BUPIVACAINE HYDROCHLORIDE 10 ML: 2.5 INJECTION, SOLUTION EPIDURAL; INFILTRATION; INTRACAUDAL at 11:07

## 2024-06-05 RX ADMIN — Medication: at 11:08

## 2024-06-05 RX ADMIN — MAGNESIUM SULFATE HEPTAHYDRATE 2 G: 2 INJECTION, SOLUTION INTRAVENOUS at 09:16

## 2024-06-05 RX ADMIN — SODIUM CHLORIDE, POTASSIUM CHLORIDE, SODIUM LACTATE AND CALCIUM CHLORIDE: 600; 310; 30; 20 INJECTION, SOLUTION INTRAVENOUS at 07:32

## 2024-06-05 ASSESSMENT — ACTIVITIES OF DAILY LIVING (ADL)
ADLS_ACUITY_SCORE: 23
ADLS_ACUITY_SCORE: 18
ADLS_ACUITY_SCORE: 23
ADLS_ACUITY_SCORE: 18
ADLS_ACUITY_SCORE: 23
ADLS_ACUITY_SCORE: 23
ADLS_ACUITY_SCORE: 18
ADLS_ACUITY_SCORE: 23
ADLS_ACUITY_SCORE: 18
ADLS_ACUITY_SCORE: 23
ADLS_ACUITY_SCORE: 18
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
## 2024-06-05 NOTE — PLAN OF CARE
"Goal Outcome Evaluation:      Plan of Care Reviewed With: patient    Overall Patient Progress: improvingOverall Patient Progress: improving     VSS. Up with standby assist. Voiding without difficulty, need to measure one more output by . Lochia light to medium, no clots. Fundus firm and midline. Pain managed with torodol and tylenol.  Hand expressing. Visiting baby in NICU. FOB supportive and at bedside. Bonding well with infant.       Problem: Adult Inpatient Plan of Care  Goal: Plan of Care Review  Description: The Plan of Care Review/Shift note should be completed every shift.  The Outcome Evaluation is a brief statement about your assessment that the patient is improving, declining, or no change.  This information will be displayed automatically on your shift  note.  Outcome: Progressing  Flowsheets (Taken 2024 4220)  Plan of Care Reviewed With: patient  Overall Patient Progress: improving  Goal: Patient-Specific Goal (Individualized)  Description: You can add care plan individualizations to a care plan. Examples of Individualization might be:  \"Parent requests to be called daily at 9am for status\", \"I have a hard time hearing out of my right ear\", or \"Do not touch me to wake me up as it startles  me\".  Outcome: Progressing  Goal: Absence of Hospital-Acquired Illness or Injury  Outcome: Progressing  Goal: Optimal Comfort and Wellbeing  Outcome: Progressing  Intervention: Provide Person-Centered Care  Recent Flowsheet Documentation  Taken 2024 1648 by Gabriella Mandujano RN  Trust Relationship/Rapport:   care explained   choices provided   questions encouraged   questions answered  Goal: Readiness for Transition of Care  Outcome: Progressing     Problem:  Labor  Goal: Delayed  Delivery  Outcome: Progressing     Problem: Postpartum (Vaginal Delivery)  Goal: Successful Parent Role Transition  Outcome: Progressing  Goal: Hemostasis  Outcome: Progressing  Goal: Absence of Infection Signs and " Symptoms  Outcome: Progressing  Goal: Anesthesia/Sedation Recovery  Outcome: Progressing  Goal: Optimal Pain Control and Function  Outcome: Progressing  Goal: Effective Urinary Elimination  Outcome: Progressing

## 2024-06-05 NOTE — PLAN OF CARE
"Patient stable. Occasional tightening felt by patient. BID monitoring, 2 contractions noted with a category 1 tracing. Patient complaining of \"left leg numbness\" earlier in the shift, but has since resolved.     Problem: Adult Inpatient Plan of Care  Goal: Plan of Care Review  Description: The Plan of Care Review/Shift note should be completed every shift.  The Outcome Evaluation is a brief statement about your assessment that the patient is improving, declining, or no change.  This information will be displayed automatically on your shift  note.  Outcome: Progressing  Goal: Patient-Specific Goal (Individualized)  Outcome: Progressing  Goal: Absence of Hospital-Acquired Illness or Injury  Outcome: Progressing  Intervention: Prevent Skin Injury  Recent Flowsheet Documentation  Taken 2024 by Shweta Styles RN  Body Position: position changed independently  Goal: Optimal Comfort and Wellbeing  Outcome: Progressing  Intervention: Provide Person-Centered Care  Recent Flowsheet Documentation  Taken 2024 1840 by Shweta Styles RN  Trust Relationship/Rapport:   care explained   emotional support provided   reassurance provided   thoughts/feelings acknowledged  Taken 2024 by Shweta Styles RN  Trust Relationship/Rapport:   care explained   emotional support provided   reassurance provided   thoughts/feelings acknowledged  Goal: Readiness for Transition of Care  Outcome: Progressing     Problem:  Labor  Goal: Delayed  Delivery  Outcome: Progressing  Intervention: Monitor and Manage  Labor  Recent Flowsheet Documentation  Taken 2024 by Shweta Styles RN  Body Position: position changed independently   Goal Outcome Evaluation:                        "

## 2024-06-05 NOTE — PROGRESS NOTES
Brief Labor Note    SUBJECTIVE   Feeling much more comfortable after epidural  No questions right now, understands the plan    OBJECTIVE     Vitals:    24 1119 24 1120 24 1123 24 1125   BP: 120/60 116/59 116/59 118/57   BP Location:       Patient Position:       Cuff Size:       Pulse:       Resp:   16    Temp:       TempSrc:       SpO2:       Weight:         SVE Trend  0809 3//-1  0905 3/-1  1041 4.5//-1  1155 6.5/90/-1     Fetal Monitoring  FHT: baseline 130s, moderate variability, 15x15 accels, no decels  [place]: not tracing well - per report/palpation around Q5min    ASSESSMENT & PLAN   33 year old  at 31w5d, with PPROM and now  labor    #. PPROM/ Labor:   - PPROM  at 30w3d. S/p latency antibiotics.  - Now comfortable with epidural  - Afebrile, continue to monitor for signs/symptoms of infection    #. FWB: Category I tracing  - S/p Betamethasone for fetal lung maturity  - Continue IV Magnesium for fetal neuroprotection  - Continue IV Penicillin for GBS positive  - Continuous external fetal monitoring  - NICU aware    MD Karo [facility]At[facility]  [phone]  2024 11:54 AM

## 2024-06-05 NOTE — PROVIDER NOTIFICATION
06/05/24 1558   Provider Notification   Provider Name/Title Dr. Lara   Method of Notification In Department   Request Evaluate - Remote   Notification Reason Maternal Vital Sign Change     MD updated. Patient has had mild range BPs since delivery, denies symptoms of pre-eclampsia. MD placing orders for labs.

## 2024-06-05 NOTE — PROGRESS NOTES
Data: Elvira transferred to  423 via wheelchair at 1735.   Action: Receiving unit notified of transfer: Yes. Patient and family notified of room change. Recovering RN continued care at time of transfer. Belongings sent to receiving unit. Accompanied by Registered Nurse. Oriented patient to surroundings. Call light within reach. ID bands double-checked with RN.  Response: Patient tolerated transfer and is stable.

## 2024-06-05 NOTE — PLAN OF CARE
"Patient requested RN at bedside at 0640 with reports of stronger, more frequent contractions that started a little after 0600. She reports lower right uterine cramping that radiates to her low back and states they are \"a lot stronger than the occasional ones I've had before, and they are happening often.\" Abdomen palpated mild; monitors applied and patient marking contractions. Report given to MORRIS Geiger.  "

## 2024-06-05 NOTE — PROGRESS NOTES
Brief Update Note    Patient had one mild range BP on 5/29, now has had multiple mild range readings after delivery.   Meets criteria for gestational hypertension. Repeat HELLP labs ordered.   Will continue to monitor BPs and start PO antihypertensive if indicated.     Lauren MacNeill, MD [facility]  [phone]  06/05/2024 4:01 PM

## 2024-06-05 NOTE — PLAN OF CARE
"Patient stable. Reports occasional cramping overnight but nothing \"out of the ordinary.\" VSS. Patient reporting difficulty sleeping and \"unable to get comfortable.\" FOB stayed overnight; patient knows to call with any concerns.    Problem: Adult Inpatient Plan of Care  Goal: Plan of Care Review  Outcome: Progressing  Flowsheets (Taken 2024 0531)  Plan of Care Reviewed With: patient  Overall Patient Progress: improving  Goal: Absence of Hospital-Acquired Illness or Injury  Outcome: Progressing  Intervention: Prevent Skin Injury  Recent Flowsheet Documentation  Taken 2024 by Christine Reynaga RN  Body Position: position changed independently     Problem:  Labor  Goal: Delayed  Delivery  Outcome: Progressing  Intervention: Monitor and Manage  Labor  Recent Flowsheet Documentation  Taken 2024 by Christine Reynaga RN  Body Position: position changed independently   Goal Outcome Evaluation:      Plan of Care Reviewed With: patient    Overall Patient Progress: improvingOverall Patient Progress: improving           "

## 2024-06-05 NOTE — ANESTHESIA PROCEDURE NOTES
Epidural catheter Procedure Note    Pre-Procedure   Staff -        Anesthesiologist:  Richard Uribe MD       Performed By: anesthesiologist       Location: OB       Pre-Anesthestic Checklist: patient identified, IV checked, risks and benefits discussed, informed consent, monitors and equipment checked, pre-op evaluation and at physician/surgeon's request  Timeout:       Correct Patient: Yes        Correct Procedure: Yes        Correct Site: Yes        Correct Position: Yes   Procedure Documentation  Procedure: epidural catheter       Diagnosis: labor       Patient Position: sitting       Patient Prep/Sterile Barriers: sterile gloves, mask, patient draped       Skin prep: Betadine       Local skin infiltrated with 3 mL of 1% lidocaine.        Insertion Site: L3-4. (midline approach).       Technique: LORT saline        PETER at 8 cm.       Needle Type: ToChartsNow (now Music[employer]ub[employer])y needle       Needle Gauge: 17.        Needle Length (Inches): 3.5        Catheter: 19 G.          Catheter threaded easily.           Threaded 14 cm at skin.         # of attempts: 1 and  # of redirects:  1    Assessment/Narrative         Paresthesias: No.       Test dose of 3 mL lidocaine 1.5% w/ 1:200,000 epinephrine at.         Test dose negative, 3 minutes after injection, for signs of intravascular, subdural, or intrathecal injection.       Insertion/Infusion Method: LORT saline       Aspiration negative for Heme or CSF via Epidural Catheter.     Comments:  Procedure tolerated well without apparent complications. Repeat aspiration negative for CSF. Initial MDA bolus of 0.25% bupivacaine was incrementally given without difficulty or complications. No abrupt changes in sensation or hemodynamic instability.  Epidural infusion (0.125% bupi with fentanyl 2mcg/ml) started at 10 ml/hr with PCEA of 5 ml q15min with a max cumulative dose of 25 ml/hr. PCEA instructions given and use encouraged PRN. Epidural expectations again reviewed and questions answered.  "Patient hemodynamically stable.  Epidural functionality to be reassessed later via vital sign flowsheet, nursing communication, and/or patient report.  Anesthesiologist immediately available for ongoing assessment and management.            FOR [facility] ([facility]) ONLY:   Pain Team Contact information: please page the Pain Team Via [employer]. Search \"Pain\". During daytime hours, please page the attending first. At night please page the resident first.      "

## 2024-06-05 NOTE — CARE PLAN
0900 Repositioning frequently for comfort with contractions, tolerating Mag ok flushed feeling  0940 Nausea and then emesis, Zofran given, difficult fetal and uterine monitoring, needs to be held by RN and contractions needed to be palpated   1041 Contractions more intense, SVE, Dr Johnson called for Epidural orders and placement   1130 Comfortable with epidural

## 2024-06-05 NOTE — PROGRESS NOTES
Brief Labor Note    SUBJECTIVE   Feeling comfortable  Can feel tightness with contractions, baby feels lower in pelvis     OBJECTIVE     Vitals:    24 1350 24 1405 24 1410 24 1420   BP: 135/66 120/57  125/58   BP Location:       Patient Position:       Cuff Size:       Pulse:       Resp:       Temp:   97.7  F (36.5  C)    TempSrc:   Oral    SpO2:       Weight:         SVE Trend  0809 3//-1  0905 3//-1  1041 4.5/90/-1  1155 6.5/90/-1  1348 9.5/100/0     Fetal Monitoring  FHT: baseline 130s, moderate variability, 15x15 accels, rare variable decels  [place]: Q3min    ASSESSMENT & PLAN   33 year old  at 31w5d, with PPROM and now  labor    #. PPROM/ Labor:   - PPROM  at 30w3d. S/p latency antibiotics.  - Comfortable with epidural  - Afebrile, continue to monitor for signs/symptoms of infection  - Anticipate vaginal delivery    #. FWB: Category I tracing  - S/p Betamethasone for fetal lung maturity  - Continue IV Magnesium for fetal neuroprotection  - Continue IV Penicillin for GBS positive  - Continuous external fetal monitoring  - NICU aware, updated of dilation    Lauren MacNeill, MD [facility]  [phone]  2024 2:28 PM

## 2024-06-05 NOTE — L&D DELIVERY NOTE
[facility]   Vaginal Delivery Note    Paz Castañeda YOB: 1990   MRN [id number] Primary OB: [facility]     DELIVERY DATE: 24   DELIVERY TIME: 1514    FINDINGS   1. Viable male infant at 31w5d.  2. Weight 1690g.  3. APGARs 8 and 9 at 1 and 5 minutes, respectively.   4. Intact placenta with 3-vessel cord.   5. Intact perineum.    6. Cord Gases: not collected, infant vigorous at birth.     DELIVERY DETAILS  Paz Castañeda is a 33 year old  who was admitted on  at 30w3d for  premature rupture of membranes. [place] was present on speculum examination. Infectious workup was collected and was negative. Ultrasound confirmed cephalic presentation. She was started on Magnesium for fetal neuroprotection, latency antibiotics, and a course of Betamethasone for fetal lung maturity. She had a rescue cerclage placed at 21wks that was removed shortly after admission, cervix appeared 1-2cm dilated. Once stability was demonstrated, Magnesium was discontinued.      Maternal Fetal Medicine and NICU were consulted on admission. Growth ultrasound on  showed estimated fetal weight of 1488g (21%) with abdominal circumference in the 9th percentile, meeting criteria for fetal growth restriction. Weekly biophysical profiles and umbilical artery doppler ultrasounds were performed by [employer] [employer] [facility] and were reassuring. Twice daily fetal monitoring was also performed.     On the morning of  at 31w5d Paz reported increasingly painful contractions. Contractions were coming every 5-6 minutes. IV Magnesium was restarted for fetal neuroprotection and IV Penicillin was started for GBS prophylaxis. Bedside ultrasound confirmed cephalic presentation and cervical exam was 3/90/-1. The NICU team was notified and updated of her labor progress. She became more uncomfortable and received an epidural for analgesia. After epidural placement, SVE was 6.5/90/-1. She progressed to complete at 1456.  Fetal surveillance was continuous and laregely category I, with some rare variable decelerations.     She pushed well for 10 minutes to deliver a viable male infant. Head delivered in direct OA position, restituted to LOT and delivered without difficulty. Right anterior shoulder delivered first, followed by left posterior shoulder without complication, followed by the body. There was a loose nuchal/body cord that was reduced after delivery. Delayed cord clamping was performed for 60 seconds. The cord was clamped x2, cut, and the baby was then placed on the maternal abdomen.     The third stage of labor was managed actively. IV Pitocin was administered. The placenta delivered with gentle traction. Inspection revealed an intact perineum. There was a small area of bleeding at the midline on the hymenal tissue that was controlled with a figure of eight suture of 3-0 Vicryl. A left periurethral laceration was hemostatic and not repaired. Good hemostasis was noted. At the end of the delivery, sponge and needle counts were correct. The infant was assessed per unit protocol.      QBL: 50cc.    COMPLICATIONS: none    Aziza Lara MD  [facility]  [phone]  2024 3:45 PM    DELIVERY SUMMARY  Clear, Male-Paz [[id number]]      Labor Event Times      Latent labor onset date/time: 2024    Active labor onset date: 24 Onset time: 11:55 AM   Dilation complete date: 24 Complete time:  2:57 PM   Start pushing date/time: 2024 1503          Labor Events     labor?: Yes  Labor Type: Spontaneous  Predominate monitoring during 1st stage: continuous electronic fetal monitoring     Antibiotics received during labor?: Yes  Reason for Antibiotics: GBS  Antibiotics received for GBS: Penicillin  Antibiotics Given (GBS): Greater than 4 hours prior to delivery       Rupture date/time: 24 011   Rupture type: Spontaneous Rupture of Membranes  Fluid color: Clear, Meconium, Pink, Other  (comment)  Fluid odor: Normal, Other (Comment)     Augmentation: None       Delivery/Placenta Date and Time      Delivery Date: 24 Delivery Time:  3:14 PM   Placenta Date/Time: 2024  3:17 PM  Oxytocin given at the time of delivery: after delivery of baby  Delivering clinician: Aziza Lara MD   Other personnel present at delivery:  Provider Role   Jeanna Taylor, RN Delivery Nurse   Fely Garsia RN Delivery Nurse             Vaginal Counts       Initial count performed by 2 team members:  Two Team Members   Dr. Jane Taylor, RN         Needles Suture Needles Sponges (RETIRED) Instruments   Initial counts 2  5    Added to count  1     Relief counts       Final counts 2 1 5            Placed during labor Accounted for at the end of labor   FSE No NA   IUPC No NA   Cervidil No NA                  Final count performed by 2 team members:  Two Team Members   Dr. Jane Taylor RN      Final count correct?: Yes  Post-Birth Team Debrief: Complete       Apgars    Living status: Living   1 Minute 5 Minute 10 Minute 15 Minute 20 Minute   Skin color: 0  1       Heart rate: 2  2       Reflex irritability: 2  2       Muscle tone: 2  2       Respiratory effort: 2  2       Total: 8  9       Apgars assigned by: AGATHA KIM       Cord      Vessels: 3 Vessels    Cord Complications: Nuchal   Nuchal Intervention: delivered through         Nuchal cord description: loose nuchal cord         Cord Blood Disposition: Discard    Gases Sent?: No    Delayed cord clamping?: Yes    Cord Clamping Delay (seconds):  seconds    Stem cell collection?: No            Resuscitation    Methods: NCPAP, Oximetry, Polyethylene Bag, Zechariah Puff, Temp Skin Probe   Care at Delivery: NNP Delivery Attendance Note:  NICU team was asked by Dr Lara to attend the delivery of this , male infant with a gestational age of 31 5/7 weeks secondary to gestational age and prolonged ROM. He  "delivered on 2024 at 3:14 PM by . He had spontaneous respirations and good tone at birth. Clamping of the umbilical cord was timed at approximately 60 seconds of life. He was brought to a preheated warmer and was placed in a polyethylene bag and on top of a transwarmer to aide thermoregulation. CPAP started for presumed RDS d/t prematurity. He continued to have good tone and respiratory effort, color quickly became pink. VSS with SpO2 WNL for age on 21% FiO2. Apgar scores were 8 and 9 at 1 and 5 minutes of life. Brief exam is WNL except for head molding. He was shown to mother and father and will be transferred to the NICU for further care.  This resuscitation and all procedures were performed and/or supervised by this author.  AGATHA Adorno, NNP-BC     2024, 3:38 PM          Measurements      Weight: 3 lb 11.6 oz Length: 1' 6.25\"     Head circumference: 28.5 cm           Labor Events and Shoulder Dystocia    Fetal Tracing Prior to Delivery: Category 1  Shoulder dystocia present?: Neg       Delivery (Maternal) (Provider to Complete) ([id number])    Episiotomy: None  Perineal lacerations: None      Periurethral laceration: left Repaired?: No     Vaginal laceration?: Yes Repaired?: Yes   Repair suture: 3-0 Vicryl  Genital tract inspection done: Pos       Blood Loss  Mother: Paz Castañeda Luci #[id number]     Start of Mother's Information      Delivery Blood Loss  24 1155 - 24 1545      Delivery QBL (mL) Hospital Encounter 50 mL    Total  50 mL               End of Mother's Information  Mother: Paz Castañeda Luci #[id number]                Delivery - Provider to Complete ([id number])    Delivering clinician: Aziza Lara MD  Delivery Type (Choose the 1 that will go to the Birth History): Vaginal, Spontaneous                         Other personnel:  Provider Role   Jeanna Taylor, RN Delivery Nurse   Fely Garsia RN Delivery Nurse                    Placenta  "   Date/Time: 6/5/2024  3:17 PM  Removal: Expressed  Disposition: Pathology             Anesthesia    Method: Epidural                    Presentation and Position    Presentation: Vertex    Position: Middle Occiput Anterior
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
## 2024-06-05 NOTE — PROVIDER NOTIFICATION
06/05/24 1458   Provider Notification   Provider Name/Title Dr. Lara   Method of Notification At Bedside   Request Attend Delivery     MD at bedside to begin pushing.

## 2024-06-06 PROBLEM — O13.9 GESTATIONAL HYPERTENSION: Status: ACTIVE | Noted: 2024-06-06

## 2024-06-06 PROCEDURE — 250N000011 HC RX IP 250 OP 636: Performed by: STUDENT IN AN ORGANIZED HEALTH CARE EDUCATION/TRAINING PROGRAM

## 2024-06-06 PROCEDURE — 250N000013 HC RX MED GY IP 250 OP 250 PS 637: Performed by: STUDENT IN AN ORGANIZED HEALTH CARE EDUCATION/TRAINING PROGRAM

## 2024-06-06 PROCEDURE — 120N000001 HC R&B MED SURG/OB

## 2024-06-06 PROCEDURE — 250N000013 HC RX MED GY IP 250 OP 250 PS 637: Performed by: OBSTETRICS & GYNECOLOGY

## 2024-06-06 RX ORDER — DOCUSATE SODIUM 100 MG/1
100 CAPSULE, LIQUID FILLED ORAL 2 TIMES DAILY PRN
Qty: 20 CAPSULE | Refills: 0 | Status: SHIPPED | OUTPATIENT
Start: 2024-06-06

## 2024-06-06 RX ORDER — ACETAMINOPHEN 325 MG/1
975 TABLET ORAL EVERY 6 HOURS PRN
Qty: 60 TABLET | Refills: 0 | Status: SHIPPED | OUTPATIENT
Start: 2024-06-06

## 2024-06-06 RX ADMIN — ACETAMINOPHEN 650 MG: 325 TABLET, FILM COATED ORAL at 05:15

## 2024-06-06 RX ADMIN — ACETAMINOPHEN 650 MG: 325 TABLET, FILM COATED ORAL at 01:13

## 2024-06-06 RX ADMIN — ENOXAPARIN SODIUM 40 MG: 40 INJECTION SUBCUTANEOUS at 09:06

## 2024-06-06 RX ADMIN — ACETAMINOPHEN 650 MG: 325 TABLET, FILM COATED ORAL at 09:06

## 2024-06-06 RX ADMIN — ACETAMINOPHEN 650 MG: 325 TABLET, FILM COATED ORAL at 23:36

## 2024-06-06 RX ADMIN — DOCUSATE SODIUM 100 MG: 100 CAPSULE, LIQUID FILLED ORAL at 09:05

## 2024-06-06 RX ADMIN — ACETAMINOPHEN 650 MG: 325 TABLET, FILM COATED ORAL at 19:15

## 2024-06-06 RX ADMIN — PRENATAL VITAMINS-IRON FUMARATE 27 MG IRON-FOLIC ACID 0.8 MG TABLET 1 TABLET: at 09:06

## 2024-06-06 ASSESSMENT — ACTIVITIES OF DAILY LIVING (ADL)
ADLS_ACUITY_SCORE: 18
ADLS_ACUITY_SCORE: 23
ADLS_ACUITY_SCORE: 18

## 2024-06-06 NOTE — DISCHARGE INSTRUCTIONS
Warning Signs after Having a Baby    Keep this paper on your fridge or somewhere else where you can see it.    Call your provider if you have any of these symptoms up to 12 weeks after having your baby.    Thoughts of hurting yourself or your baby  Pain in your chest or trouble breathing  Severe headache not helped by pain medicine  Eyesight concerns (blurry vision, seeing spots or flashes of light, other changes to eyesight)  Fainting, shaking or other signs of a seizure    Call 9-1-1 if you feel that it is an emergency.     The symptoms below can happen to anyone after giving birth. They can be very serious. Call your provider if you have any of these warning signs.    My provider s phone number: _______________________    Losing too much blood (hemorrhage)    Call your provider if you soak through a pad in less than an hour or pass blood clots bigger than a golf ball. These may be signs that you are bleeding too much.    Blood clots in the legs or lungs    After you give birth, your body naturally clots its blood to help prevent blood loss. Sometimes this increased clotting can happen in other areas of the body, like the legs or lungs. This can block your blood flow and be very dangerous.     Call your provider if you:  Have a red, swollen spot on the back of your leg that is warm or painful when you touch it.   Are coughing up blood.     Infection    Call your provider if you have any of these symptoms:  Fever of 100.4 F (38 C) or higher.  Pain or redness around your stitches if you had an incision.   Any yellow, white, or green fluid coming from places where you had stitches or surgery.    Mood Problems (postpartum depression)    Many people feel sad or have mood changes after having a baby. But for some people, these mood swings are worse.     Call your provider right away if you feel so anxious or nervous that you can't care for yourself or your baby.    Preeclampsia (high blood pressure)    Even if you  didn't have high blood pressure when you were pregnant, you are at risk for the high blood pressure disease called preeclampsia. This risk can last up to 12 weeks after giving birth.     Call your provider if you have:   Pain on your right side under your rib cage  Sudden swelling in the hands and face    Remember: You know your body. If something doesn't feel right, get medical help.     For informational purposes only. Not to replace the advice of your health care provider. Copyright 2020 [facility]. All rights reserved. Clinically reviewed by Maria L Beckett, RNC-OB, MSN. [employer] 164496 - Rev 02/23.

## 2024-06-06 NOTE — ANESTHESIA POSTPROCEDURE EVALUATION
Patient: Paz Castañeda      S/P epidural for labor.   I or my partner was immediately available for management of this patient during epidural analgesia infusion.  VSS.  Doing well. Block resolved.  Neuro at baseline. Denies positional headache. Minimal side effects easily managed w/ PRN meds. No apparent anesthetic complications. No follow-up required.    Zane Bal MD      Note:  Anesthesia Post Evaluation    Last vitals:  Vitals:    06/05/24 2112 06/06/24 0115 06/06/24 0517   BP: 121/81 124/76 108/69   Pulse: 80 76 72   Resp: 16 16 16   Temp: 97.9  F (36.6  C) 98  F (36.7  C) 98.3  F (36.8  C)   SpO2:          Electronically Signed By: Zane Bal MD  June 6, 2024  8:24 AM
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
## 2024-06-06 NOTE — PLAN OF CARE
VSS on room air. Fundus/lochia WDL. Voiding appropriately and ambulating independently. Pain adequately controlled with PRN medications. Pumping for infant in NICU intermittently, educated pt on importance of pumping q2-3hrs. S/o at bedside, supportive. Positive bonding and attachment with infant observed.      Problem: Adult Inpatient Plan of Care  Goal: Plan of Care Review  Description: The Plan of Care Review/Shift note should be completed every shift.  The Outcome Evaluation is a brief statement about your assessment that the patient is improving, declining, or no change.  This information will be displayed automatically on your shift  note.  Outcome: Progressing  Flowsheets (Taken 2024)  Plan of Care Reviewed With: patient  Overall Patient Progress: improving  Goal: Absence of Hospital-Acquired Illness or Injury  Intervention: Prevent Skin Injury  Recent Flowsheet Documentation  Taken 2024 by Francisca Funk RN  Body Position: position changed independently  Taken 2024 by Francisca Funk RN  Body Position: position changed independently  Intervention: Prevent Infection  Recent Flowsheet Documentation  Taken 2024 by Francisca Funk RN  Infection Prevention:   rest/sleep promoted   hand hygiene promoted  Goal: Optimal Comfort and Wellbeing  Intervention: Monitor Pain and Promote Comfort  Recent Flowsheet Documentation  Taken 2024 by Francisca Funk RN  Pain Management Interventions: medication (see MAR)  Intervention: Provide Person-Centered Care  Recent Flowsheet Documentation  Taken 2024 by Francisca Funk RN  Trust Relationship/Rapport:   care explained   choices provided   questions encouraged     Problem:  Labor  Goal: Delayed  Delivery  Intervention: Monitor and Manage  Labor  Recent Flowsheet Documentation  Taken 2024 by Francisca Funk RN  Body Position: position changed independently  Taken 2024 by Francisca Funk RN  Body  Position: position changed independently     Problem: Labor  Goal: Stable Fetal Wellbeing  Intervention: Promote and Monitor Fetal Wellbeing  Recent Flowsheet Documentation  Taken 6/6/2024 0115 by Francisca Funk RN  Body Position: position changed independently  Taken 6/6/2024 0113 by Francisca Funk RN  Body Position: position changed independently  Goal: Absence of Infection Signs and Symptoms  Intervention: Prevent or Manage Infection  Recent Flowsheet Documentation  Taken 6/6/2024 0115 by Francisca Funk RN  Infection Prevention:   rest/sleep promoted   hand hygiene promoted     Problem: Postpartum (Vaginal Delivery)  Goal: Optimal Pain Control and Function  Intervention: Prevent or Manage Pain  Recent Flowsheet Documentation  Taken 6/6/2024 0113 by Francisca Funk RN  Pain Management Interventions: medication (see MAR)   Goal Outcome Evaluation:      Plan of Care Reviewed With: patient    Overall Patient Progress: improvingOverall Patient Progress: improving

## 2024-06-06 NOTE — PLAN OF CARE
"Goal Outcome Evaluation:      Plan of Care Reviewed With: patient    Overall Patient Progress: improvingOverall Patient Progress: improving     VSS. Up ad vargas. Voiding without difficulty. Lochia scant, no clots. Fundus firm and midline. Pain managed with tylenol.  Pumping at regular intervals. FOB supportive and at bedside. Visiting baby in NICU, Ipad at bedside.      Problem: Adult Inpatient Plan of Care  Goal: Plan of Care Review  Description: The Plan of Care Review/Shift note should be completed every shift.  The Outcome Evaluation is a brief statement about your assessment that the patient is improving, declining, or no change.  This information will be displayed automatically on your shift  note.  Outcome: Progressing  Flowsheets (Taken 6/6/2024 1705)  Plan of Care Reviewed With: patient  Overall Patient Progress: improving  Goal: Patient-Specific Goal (Individualized)  Description: You can add care plan individualizations to a care plan. Examples of Individualization might be:  \"Parent requests to be called daily at 9am for status\", \"I have a hard time hearing out of my right ear\", or \"Do not touch me to wake me up as it startles  me\".  Outcome: Progressing  Goal: Absence of Hospital-Acquired Illness or Injury  Outcome: Progressing  Intervention: Prevent Skin Injury  Recent Flowsheet Documentation  Taken 6/6/2024 1701 by Gabriella Mandujano RN  Body Position: position changed independently  Taken 6/6/2024 0910 by Gabriella Mandujano, RN  Body Position: position changed independently  Goal: Optimal Comfort and Wellbeing  Outcome: Progressing  Intervention: Monitor Pain and Promote Comfort  Recent Flowsheet Documentation  Taken 6/6/2024 0906 by Gabriella Mandujano RN  Pain Management Interventions: medication (see MAR)  Intervention: Provide Person-Centered Care  Recent Flowsheet Documentation  Taken 6/6/2024 1701 by Gabriella Mandujano RN  Trust Relationship/Rapport:   care explained   choices provided   questions " encouraged  Taken 2024 0910 by Gabriella Mandujano RN  Trust Relationship/Rapport:   care explained   choices provided   questions encouraged  Goal: Readiness for Transition of Care  Outcome: Progressing     Problem:  Labor  Goal: Delayed  Delivery  Outcome: Progressing  Intervention: Monitor and Manage  Labor  Recent Flowsheet Documentation  Taken 2024 1701 by Gabriella Mandujano RN  Body Position: position changed independently  Taken 2024 0910 by Gabriella Mandujano RN  Body Position: position changed independently     Problem: Postpartum (Vaginal Delivery)  Goal: Successful Parent Role Transition  Outcome: Progressing  Goal: Hemostasis  Outcome: Progressing  Goal: Absence of Infection Signs and Symptoms  Outcome: Progressing  Goal: Anesthesia/Sedation Recovery  Outcome: Progressing  Goal: Optimal Pain Control and Function  Outcome: Progressing  Intervention: Prevent or Manage Pain  Recent Flowsheet Documentation  Taken 2024 0906 by Gabriella Mandujano RN  Pain Management Interventions: medication (see MAR)  Goal: Effective Urinary Elimination  Outcome: Progressing

## 2024-06-07 LAB
PATH REPORT.COMMENTS IMP SPEC: NORMAL
PATH REPORT.COMMENTS IMP SPEC: NORMAL
PATH REPORT.FINAL DX SPEC: NORMAL
PATH REPORT.GROSS SPEC: NORMAL
PATH REPORT.MICROSCOPIC SPEC OTHER STN: NORMAL
PATH REPORT.RELEVANT HX SPEC: NORMAL
PHOTO IMAGE: NORMAL

## 2024-06-07 PROCEDURE — 250N000013 HC RX MED GY IP 250 OP 250 PS 637: Performed by: OBSTETRICS & GYNECOLOGY

## 2024-06-07 PROCEDURE — 250N000011 HC RX IP 250 OP 636: Mod: JZ | Performed by: STUDENT IN AN ORGANIZED HEALTH CARE EDUCATION/TRAINING PROGRAM

## 2024-06-07 PROCEDURE — 120N000001 HC R&B MED SURG/OB

## 2024-06-07 PROCEDURE — 250N000013 HC RX MED GY IP 250 OP 250 PS 637: Performed by: STUDENT IN AN ORGANIZED HEALTH CARE EDUCATION/TRAINING PROGRAM

## 2024-06-07 RX ADMIN — ENOXAPARIN SODIUM 40 MG: 40 INJECTION SUBCUTANEOUS at 09:57

## 2024-06-07 RX ADMIN — ACETAMINOPHEN 650 MG: 325 TABLET, FILM COATED ORAL at 16:27

## 2024-06-07 RX ADMIN — PRENATAL VITAMINS-IRON FUMARATE 27 MG IRON-FOLIC ACID 0.8 MG TABLET 1 TABLET: at 09:57

## 2024-06-07 RX ADMIN — ACETAMINOPHEN 650 MG: 325 TABLET, FILM COATED ORAL at 21:31

## 2024-06-07 RX ADMIN — DOCUSATE SODIUM 100 MG: 100 CAPSULE, LIQUID FILLED ORAL at 09:57

## 2024-06-07 ASSESSMENT — ACTIVITIES OF DAILY LIVING (ADL)
ADLS_ACUITY_SCORE: 18

## 2024-06-07 NOTE — PLAN OF CARE
Goal Outcome Evaluation:      Plan of Care Reviewed With: patient    Overall Patient Progress: improving     Data: Vital signs within normal limits, patient denies preeclampsia symptoms. Postpartum checks within normal limits - see flow record. Patient eating and drinking normally. Patient able to empty bladder independently and is up ambulating. Patient is pumping for infant in the NICU every few hours  Action: Patient medicated with tylenol during the shift for pain. See MAR. Adequate pain control noted by patient. Patient education done, see flow record.  Response: Patient's spouse present this shift. Patient visiting infant in the NICU for majority of shift.  Plan: Continue current plan of care.  Anticipate discharge in 1-2 days. Will continue to monitor and treat.

## 2024-06-07 NOTE — PLAN OF CARE
VSS on room air. Fundus/lochia WDL. Voiding appropriately and ambulating independently. Pain adequately controlled with PRN medications. Encouraged pt to pump for infant in NICU. S/o at bedside. Ipad in the room.     Problem: Adult Inpatient Plan of Care  Goal: Plan of Care Review  Description: The Plan of Care Review/Shift note should be completed every shift.  The Outcome Evaluation is a brief statement about your assessment that the patient is improving, declining, or no change.  This information will be displayed automatically on your shift  note.  Outcome: Progressing  Flowsheets (Taken 2024)  Plan of Care Reviewed With: patient  Overall Patient Progress: improving  Goal: Absence of Hospital-Acquired Illness or Injury  Intervention: Prevent Skin Injury  Recent Flowsheet Documentation  Taken 2024 by Francisca Funk RN  Body Position: position changed independently  Intervention: Prevent Infection  Recent Flowsheet Documentation  Taken 2024 by Francisca Funk RN  Infection Prevention:   rest/sleep promoted   hand hygiene promoted  Goal: Optimal Comfort and Wellbeing  Intervention: Provide Person-Centered Care  Recent Flowsheet Documentation  Taken 2024 by Francisca Funk RN  Trust Relationship/Rapport:   care explained   choices provided   questions answered   questions encouraged   reassurance provided     Problem:  Labor  Goal: Delayed  Delivery  Intervention: Monitor and Manage  Labor  Recent Flowsheet Documentation  Taken 2024 by Francisca Funk RN  Body Position: position changed independently     Problem: Labor  Goal: Stable Fetal Wellbeing  Intervention: Promote and Monitor Fetal Wellbeing  Recent Flowsheet Documentation  Taken 2024 by Francisca Funk RN  Body Position: position changed independently  Goal: Absence of Infection Signs and Symptoms  Intervention: Prevent or Manage Infection  Recent Flowsheet Documentation  Taken 2024  by Rafle, Francisca, RN  Infection Prevention:   rest/sleep promoted   hand hygiene promoted   Goal Outcome Evaluation:      Plan of Care Reviewed With: patient    Overall Patient Progress: improvingOverall Patient Progress: improving
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
## 2024-06-07 NOTE — LACTATION NOTE
This note was copied from a baby's chart.  Lactation visit; Paz doing STS in NICU- praise provided and Paz plans on pumping after STS. Discussed benefits of pumping when visiting infant in NICU. Paz reports pumping q3 hours- also reviewed benefits of hand expression. Discussed benefits of hospital grade rental pump with  infant in NICU. Reports she ordered a Motif pump through insurance and states it is a rush order so hoping to receive tomorrow. Encouraged to check insurance coverage for hospital grade rental. During visit Paz reports recently losing a job and is concerned about finances- primary RN placed social work consult for resources. All questions answered and aware lactation available for continued support.

## 2024-06-07 NOTE — PLAN OF CARE
"Vital signs stable. Denies headache, vision changes, epigastric pain, nausea. Fundus is firm and midline, scant lochia. Voiding spontaneously. Ambulating independently, denies dizziness. Reports pain is well controlled with tylenol. Pumping 3 hours - see lactation note. Visiting infant in NICU. Spouse supportive at bedside.    Problem: Adult Inpatient Plan of Care  Goal: Plan of Care Review  Description: The Plan of Care Review/Shift note should be completed every shift.  The Outcome Evaluation is a brief statement about your assessment that the patient is improving, declining, or no change.  This information will be displayed automatically on your shift  note.  Outcome: Progressing  Flowsheets (Taken 6/7/2024 1743)  Outcome Evaluation: stable  Plan of Care Reviewed With: patient  Overall Patient Progress: improving  Goal: Patient-Specific Goal (Individualized)  Description: You can add care plan individualizations to a care plan. Examples of Individualization might be:  \"Parent requests to be called daily at 9am for status\", \"I have a hard time hearing out of my right ear\", or \"Do not touch me to wake me up as it startles  me\".  Outcome: Progressing  Goal: Absence of Hospital-Acquired Illness or Injury  Outcome: Progressing  Intervention: Prevent Skin Injury  Recent Flowsheet Documentation  Taken 6/7/2024 1629 by Oneida Dickerson, RN  Body Position: position changed independently  Taken 6/7/2024 0956 by Oneida Dickerson, RN  Body Position: position changed independently  Intervention: Prevent Infection  Recent Flowsheet Documentation  Taken 6/7/2024 1629 by Oneida Dickerson, RN  Infection Prevention:   rest/sleep promoted   hand hygiene promoted  Taken 6/7/2024 0956 by Oneida Dickerson, RN  Infection Prevention:   rest/sleep promoted   hand hygiene promoted  Goal: Optimal Comfort and Wellbeing  Outcome: Progressing  Intervention: Monitor Pain and Promote Comfort  Recent Flowsheet Documentation  Taken 6/7/2024 1627 by " Oneida Dickerson RN  Pain Management Interventions: medication (see MAR)  Intervention: Provide Person-Centered Care  Recent Flowsheet Documentation  Taken 2024 162 by Oneida Dickerson RN  Trust Relationship/Rapport:   care explained   choices provided   questions answered   questions encouraged   reassurance provided   thoughts/feelings acknowledged  Taken 2024 0956 by Oneida Dickerson RN  Trust Relationship/Rapport:   care explained   choices provided   questions answered   questions encouraged   reassurance provided   thoughts/feelings acknowledged  Goal: Readiness for Transition of Care  Outcome: Progressing     Problem:  Labor  Goal: Delayed  Delivery  Outcome: Progressing  Intervention: Monitor and Manage  Labor  Recent Flowsheet Documentation  Taken 2024 1629 by Oneida Dickerson RN  Body Position: position changed independently  Taken 2024 09 by Oneida Dickerson RN  Body Position: position changed independently     Problem: Postpartum (Vaginal Delivery)  Goal: Successful Parent Role Transition  Outcome: Progressing  Intervention: Support Parent Role Transition  Recent Flowsheet Documentation  Taken 2024 162 by Oneida Dickerson RN  Supportive Measures:   active listening utilized   decision-making supported  Parent-Child Attachment Promotion:   caring behavior modeled   cue recognition promoted   face-to-face positioning promoted   positive reinforcement provided  Taken 2024 0956 by Oneida Dickerson RN  Supportive Measures:   active listening utilized   decision-making supported  Parent-Child Attachment Promotion:   caring behavior modeled   cue recognition promoted   face-to-face positioning promoted   positive reinforcement provided  Goal: Hemostasis  Outcome: Progressing  Goal: Absence of Infection Signs and Symptoms  Outcome: Progressing  Goal: Anesthesia/Sedation Recovery  Outcome: Progressing  Goal: Optimal Pain Control and Function  Outcome:  Progressing  Intervention: Prevent or Manage Pain  Recent Flowsheet Documentation  Taken 6/7/2024 1627 by Oneida Dickerson RN  Pain Management Interventions: medication (see MAR)  Goal: Effective Urinary Elimination  Outcome: Progressing     Problem: Labor  Goal: Stable Fetal Wellbeing  Intervention: Promote and Monitor Fetal Wellbeing  Recent Flowsheet Documentation  Taken 6/7/2024 1629 by Oneida Dickerson RN  Body Position: position changed independently  Taken 6/7/2024 0956 by Oneida Dickerson RN  Body Position: position changed independently  Goal: Absence of Infection Signs and Symptoms  Intervention: Prevent or Manage Infection  Recent Flowsheet Documentation  Taken 6/7/2024 1629 by Oneida Dickerson RN  Infection Prevention:   rest/sleep promoted   hand hygiene promoted  Taken 6/7/2024 0956 by Oneida Dickerson RN  Infection Prevention:   rest/sleep promoted   hand hygiene promoted   Goal Outcome Evaluation:      Plan of Care Reviewed With: patient    Overall Patient Progress: improvingOverall Patient Progress: improving    Outcome Evaluation: stable

## 2024-06-08 VITALS
HEART RATE: 82 BPM | RESPIRATION RATE: 16 BRPM | DIASTOLIC BLOOD PRESSURE: 78 MMHG | WEIGHT: 248.1 LBS | OXYGEN SATURATION: 100 % | BODY MASS INDEX: 37.72 KG/M2 | SYSTOLIC BLOOD PRESSURE: 122 MMHG | TEMPERATURE: 98.8 F

## 2024-06-08 LAB — PLATELET # BLD AUTO: 294 10E3/UL (ref 150–450)

## 2024-06-08 PROCEDURE — 85049 AUTOMATED PLATELET COUNT: CPT | Performed by: STUDENT IN AN ORGANIZED HEALTH CARE EDUCATION/TRAINING PROGRAM

## 2024-06-08 PROCEDURE — 250N000013 HC RX MED GY IP 250 OP 250 PS 637: Performed by: STUDENT IN AN ORGANIZED HEALTH CARE EDUCATION/TRAINING PROGRAM

## 2024-06-08 PROCEDURE — 36415 COLL VENOUS BLD VENIPUNCTURE: CPT | Performed by: STUDENT IN AN ORGANIZED HEALTH CARE EDUCATION/TRAINING PROGRAM

## 2024-06-08 PROCEDURE — 250N000011 HC RX IP 250 OP 636: Mod: JZ | Performed by: STUDENT IN AN ORGANIZED HEALTH CARE EDUCATION/TRAINING PROGRAM

## 2024-06-08 PROCEDURE — 250N000013 HC RX MED GY IP 250 OP 250 PS 637: Performed by: OBSTETRICS & GYNECOLOGY

## 2024-06-08 RX ADMIN — PRENATAL VITAMINS-IRON FUMARATE 27 MG IRON-FOLIC ACID 0.8 MG TABLET 1 TABLET: at 09:21

## 2024-06-08 RX ADMIN — ACETAMINOPHEN 650 MG: 325 TABLET, FILM COATED ORAL at 05:46

## 2024-06-08 RX ADMIN — ENOXAPARIN SODIUM 40 MG: 40 INJECTION SUBCUTANEOUS at 09:19

## 2024-06-08 RX ADMIN — ACETAMINOPHEN 650 MG: 325 TABLET, FILM COATED ORAL at 09:20

## 2024-06-08 RX ADMIN — DOCUSATE SODIUM 100 MG: 100 CAPSULE, LIQUID FILLED ORAL at 09:21

## 2024-06-08 RX ADMIN — ACETAMINOPHEN 650 MG: 325 TABLET, FILM COATED ORAL at 01:21

## 2024-06-08 ASSESSMENT — ACTIVITIES OF DAILY LIVING (ADL)
ADLS_ACUITY_SCORE: 18
DEPENDENT_IADLS:: INDEPENDENT

## 2024-06-08 NOTE — PLAN OF CARE
"Vital signs stable. Denies headache, vision changes, epigastric pain, nausea. Fundus is firm and midline, scant lochia. Voiding spontaneously. Ambulating independently, denies dizziness. Reports pain is well controlled with tylenol. Pumping every 3 hours. Visiting infant in NICU.    Problem: Adult Inpatient Plan of Care  Goal: Plan of Care Review  Description: The Plan of Care Review/Shift note should be completed every shift.  The Outcome Evaluation is a brief statement about your assessment that the patient is improving, declining, or no change.  This information will be displayed automatically on your shift  note.  Outcome: Adequate for Care Transition  Flowsheets (Taken 6/8/2024 0948)  Outcome Evaluation: ready for discharge  Plan of Care Reviewed With: patient  Overall Patient Progress: improving  Goal: Patient-Specific Goal (Individualized)  Description: You can add care plan individualizations to a care plan. Examples of Individualization might be:  \"Parent requests to be called daily at 9am for status\", \"I have a hard time hearing out of my right ear\", or \"Do not touch me to wake me up as it startles  me\".  Outcome: Adequate for Care Transition  Goal: Absence of Hospital-Acquired Illness or Injury  Outcome: Adequate for Care Transition  Intervention: Prevent Skin Injury  Recent Flowsheet Documentation  Taken 6/8/2024 0920 by Oneida Dickerson RN  Body Position: position changed independently  Intervention: Prevent Infection  Recent Flowsheet Documentation  Taken 6/8/2024 0920 by Oneida Dickerson, RN  Infection Prevention:   rest/sleep promoted   hand hygiene promoted  Goal: Optimal Comfort and Wellbeing  Outcome: Adequate for Care Transition  Intervention: Monitor Pain and Promote Comfort  Recent Flowsheet Documentation  Taken 6/8/2024 0920 by Oneida Dickerson, RN  Pain Management Interventions: medication (see MAR)  Intervention: Provide Person-Centered Care  Recent Flowsheet Documentation  Taken 6/8/2024 0920 by " Oneida Dickerson RN  Trust Relationship/Rapport:   care explained   choices provided   questions answered   questions encouraged   reassurance provided   thoughts/feelings acknowledged  Goal: Readiness for Transition of Care  Outcome: Adequate for Care Transition     Problem:  Labor  Goal: Delayed  Delivery  Outcome: Adequate for Care Transition  Intervention: Monitor and Manage  Labor  Recent Flowsheet Documentation  Taken 2024 by Oneida Dickerson RN  Body Position: position changed independently     Problem: Postpartum (Vaginal Delivery)  Goal: Successful Parent Role Transition  Outcome: Adequate for Care Transition  Intervention: Support Parent Role Transition  Recent Flowsheet Documentation  Taken 2024 by Oneida Dickerson RN  Supportive Measures:   active listening utilized   decision-making supported  Parent-Child Attachment Promotion:   caring behavior modeled   cue recognition promoted   face-to-face positioning promoted   positive reinforcement provided  Goal: Hemostasis  Outcome: Adequate for Care Transition  Goal: Absence of Infection Signs and Symptoms  Outcome: Adequate for Care Transition  Goal: Anesthesia/Sedation Recovery  Outcome: Adequate for Care Transition  Goal: Optimal Pain Control and Function  Outcome: Adequate for Care Transition  Intervention: Prevent or Manage Pain  Recent Flowsheet Documentation  Taken 2024 by Oneida Dickerson RN  Pain Management Interventions: medication (see MAR)  Goal: Effective Urinary Elimination  Outcome: Adequate for Care Transition   Goal Outcome Evaluation:      Plan of Care Reviewed With: patient    Overall Patient Progress: improvingOverall Patient Progress: improving    Outcome Evaluation: ready for discharge

## 2024-06-08 NOTE — CONSULTS
SWS received new consult for pt, due to financial issues, and needing a breast pump as she is discharging today and her pump has not yet been delivered. Lactation and RN asking for assistance in securing a hand pump, none available on site.  Writer met with pt and she indicated she would shop at [employer] rather than [employer], so pt was provided two $25 [employer] Gift cards so she can purchase a hand pump. Pt expressed gratitude. She reports baby has improved so much already and is off of oxygen as of today. Pt thinks she may need diapers and clothing due to baby's small size, however knows he will have some time in NICU and unsure what size he will be when he is able to discharge home.   Writer will pass along to following SW staff.  Pt stated she is aware of how to reach out to SWS for needs while baby remains in NICU.     Brigida SAAVEDRA, Casual   Inpatient Care Coordination  [facility]  [phone]

## 2024-06-08 NOTE — PLAN OF CARE
Postpartum day 3 s/p vaginal delivery. Vital signs stable. Fundus firm, midline, 1 cm below umbilicus. Scant lochia. Perineum WDL. Ambulating independently. Eating regular diet. Denies N/V. Voiding without difficulty. Passing flatus. Pain is adequately controlled. Patient medicated with Tylenol this shift for pain. Using ice pack pads in addition.  Patient is pumping and tolerating well. Breasts WDL. Spouse at bedside during shift. Patient condition improving and progressing normally. Anticipate discharge tomorrow.         Problem: Adult Inpatient Plan of Care  Goal: Plan of Care Review  Description: The Plan of Care Review/Shift note should be completed every shift.  The Outcome Evaluation is a brief statement about your assessment that the patient is improving, declining, or no change.  This information will be displayed automatically on your shift  note.  Outcome: Progressing  Flowsheets (Taken 6/7/2024 2219)  Plan of Care Reviewed With: patient  Overall Patient Progress: improving  Goal: Absence of Hospital-Acquired Illness or Injury  Outcome: Progressing  Intervention: Prevent Skin Injury  Recent Flowsheet Documentation  Taken 6/7/2024 2129 by Emily Eastman RN  Body Position: position changed independently  Intervention: Prevent Infection  Recent Flowsheet Documentation  Taken 6/7/2024 2129 by Emily Eastman RN  Infection Prevention:   rest/sleep promoted   hand hygiene promoted  Goal: Optimal Comfort and Wellbeing  Outcome: Progressing  Intervention: Monitor Pain and Promote Comfort  Recent Flowsheet Documentation  Taken 6/7/2024 2129 by Emily Eastman RN  Pain Management Interventions:   medication (see MAR)   cold applied  Intervention: Provide Person-Centered Care  Recent Flowsheet Documentation  Taken 6/7/2024 2129 by Emily Eastman RN  Trust Relationship/Rapport:   care explained   choices provided   questions answered   questions encouraged   reassurance provided   thoughts/feelings  acknowledged  Goal: Readiness for Transition of Care  Outcome: Progressing     Problem:  Labor  Goal: Delayed  Delivery  Outcome: Progressing  Intervention: Monitor and Manage  Labor  Recent Flowsheet Documentation  Taken 2024 by Emily Eastman RN  Body Position: position changed independently     Problem: Postpartum (Vaginal Delivery)  Goal: Successful Parent Role Transition  Outcome: Progressing  Intervention: Support Parent Role Transition  Recent Flowsheet Documentation  Taken 2024 by Emily Eastman RN  Supportive Measures:   active listening utilized   decision-making supported  Parent-Child Attachment Promotion:   caring behavior modeled   cue recognition promoted   face-to-face positioning promoted   positive reinforcement provided  Goal: Hemostasis  Outcome: Progressing  Goal: Absence of Infection Signs and Symptoms  Outcome: Progressing  Goal: Anesthesia/Sedation Recovery  Outcome: Progressing  Goal: Optimal Pain Control and Function  Outcome: Progressing  Intervention: Prevent or Manage Pain  Recent Flowsheet Documentation  Taken 2024 by Emily Eastman RN  Pain Management Interventions:   medication (see MAR)   cold applied  Goal: Effective Urinary Elimination  Outcome: Progressing

## 2024-06-08 NOTE — DISCHARGE SUMMARY
Discharge instructions completed.  Patient states she understands all discharge instructions and all of her questions have been answered.  Patient verbalizes when she needs to return to clinic for follow up for herself.  She is caring for herself independently.  Prescriptions filled and given to patient/family.  Postpartum depression symptoms reviewed and encouraged frequent review of depression scale. Pt's breast pump arrived today. Blood pressure cuff given to pt and demonstrated use. Patient discharged with spouse.
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
## 2024-06-08 NOTE — LACTATION NOTE
Lactation check in prior to discharge; Elvira reporting just getting email notification that her [employer] breast pump was delivered so she has pump at home. Also saw SW today for resources if she wanted to get hand pump as well. Elvira reporting recently getting 15 ml 30 ml with last pump sessions- discussed maintain mode on hospital grade pump.  Encouraged to use hospital grade pump when visiting infant in NICU. States no other questions at this time but is aware lactation available for continued support.

## 2024-06-08 NOTE — PLAN OF CARE
Pt VSS.  Postpartum checks WDL. Denies headache, vision changes, and epigastric pain.  Tolerating regular diet and able to ambulate independently. Urine output adequate, voids without difficulty. Pt utilizing tylenol for pain management. Pumping for  in NICU.    Problem: Adult Inpatient Plan of Care  Goal: Plan of Care Review  Description: The Plan of Care Review/Shift note should be completed every shift.  The Outcome Evaluation is a brief statement about your assessment that the patient is improving, declining, or no change.  This information will be displayed automatically on your shift  note.  Outcome: Progressing  Flowsheets (Taken 2024)  Plan of Care Reviewed With: patient  Overall Patient Progress: improving  Outcome: Progressing  Goal: Absence of Hospital-Acquired Illness or Injury  Outcome: Progressing  Intervention: Prevent Skin Injury  Recent Flowsheet Documentation  Taken 2024 by Aziza Kelly RN  Body Position: position changed independently  Intervention: Prevent Infection  Recent Flowsheet Documentation  Taken 2024 by Aziza Kelly RN  Infection Prevention:   hand hygiene promoted   rest/sleep promoted  Goal: Optimal Comfort and Wellbeing  Outcome: Progressing  Intervention: Provide Person-Centered Care  Recent Flowsheet Documentation  Taken 2024 by Aziza Kelly RN  Trust Relationship/Rapport:   care explained   choices provided   questions answered   questions encouraged   thoughts/feelings acknowledged  Goal: Readiness for Transition of Care  Outcome: Progressing     Problem:  Labor  Goal: Delayed  Delivery  Outcome: Progressing  Intervention: Monitor and Manage  Labor  Recent Flowsheet Documentation  Taken 2024 by Aziza Kelly RN  Body Position: position changed independently     Problem: Postpartum (Vaginal Delivery)  Goal: Successful Parent Role Transition  Outcome: Progressing  Goal: Hemostasis  Outcome:  Progressing  Goal: Absence of Infection Signs and Symptoms  Outcome: Progressing  Goal: Anesthesia/Sedation Recovery  Outcome: Progressing  Goal: Optimal Pain Control and Function  Outcome: Progressing  Goal: Effective Urinary Elimination  Outcome: Progressing

## 2024-08-04 ENCOUNTER — HEALTH MAINTENANCE LETTER (OUTPATIENT)
Age: 34
End: 2024-08-04

## 2024-08-12 NOTE — PLAN OF CARE
"  Problem: Adult Inpatient Plan of Care  Goal: Plan of Care Review  Description: The Plan of Care Review/Shift note should be completed every shift.  The Outcome Evaluation is a brief statement about your assessment that the patient is improving, declining, or no change.  This information will be displayed automatically on your shift  note.  2024 by Sanna Elise RN  Outcome: Progressing  2024 by Sanna Elise RN  Outcome: Progressing  Goal: Patient-Specific Goal (Individualized)  Description: You can add care plan individualizations to a care plan. Examples of Individualization might be:  \"Parent requests to be called daily at 9am for status\", \"I have a hard time hearing out of my right ear\", or \"Do not touch me to wake me up as it startles  me\".  2024 by Sanna Elise RN  Outcome: Progressing  2024 by Sanna Elise RN  Outcome: Progressing  Goal: Absence of Hospital-Acquired Illness or Injury  2024 by Sanna Elise RN  Outcome: Progressing  2024 by Sanna Elise RN  Outcome: Progressing  Goal: Optimal Comfort and Wellbeing  2024 by Sanna Elise RN  Outcome: Progressing  2024 by Sanna Elise RN  Outcome: Progressing  Intervention: Provide Person-Centered Care  Recent Flowsheet Documentation  Taken 2024 225 by Sanna Elise RN  Trust Relationship/Rapport:   thoughts/feelings acknowledged   emotional support provided  Goal: Readiness for Transition of Care  2024 by Sanna Elise RN  Outcome: Progressing  2024 by Sanna Elise RN  Outcome: Progressing     Problem:  Labor  Goal: Delayed  Delivery  2024 by Sanna Elise RN  Outcome: Progressing  2024 by Sanna Elise RN  Outcome: Progressing   Goal Outcome Evaluation:                        " Detail Level: Generalized Detail Level: Simple Detail Level: Detailed Sunscreen Recommendations: applied daily to exposed areas Skin Checks Recommendations: Full body skin check annually Detail Level: Zone

## 2025-03-19 NOTE — PROGRESS NOTES
Patient unavailable when Public Health Nurse (PHN) stopped by to discuss [facility] ([facility]) resources.   83

## 2025-08-16 ENCOUNTER — HEALTH MAINTENANCE LETTER (OUTPATIENT)
Age: 35
End: 2025-08-16